# Patient Record
Sex: MALE | Race: WHITE | NOT HISPANIC OR LATINO | Employment: FULL TIME | URBAN - METROPOLITAN AREA
[De-identification: names, ages, dates, MRNs, and addresses within clinical notes are randomized per-mention and may not be internally consistent; named-entity substitution may affect disease eponyms.]

---

## 2023-02-24 LAB
ACTIVATED PARTIAL THROMBOPLASTIN TIME IN PPP BY COAGULATION ASSAY: 28 SEC (ref 26–39)
ALANINE AMINOTRANSFERASE (SGPT) (U/L) IN SER/PLAS: 28 U/L (ref 10–52)
ALBUMIN (G/DL) IN SER/PLAS: 4.2 G/DL (ref 3.4–5)
ALKALINE PHOSPHATASE (U/L) IN SER/PLAS: 42 U/L (ref 33–136)
ANION GAP IN SER/PLAS: 13 MMOL/L (ref 10–20)
ASPARTATE AMINOTRANSFERASE (SGOT) (U/L) IN SER/PLAS: 17 U/L (ref 9–39)
BASOPHILS (10*3/UL) IN BLOOD BY AUTOMATED COUNT: 0.11 X10E9/L (ref 0–0.1)
BASOPHILS/100 LEUKOCYTES IN BLOOD BY AUTOMATED COUNT: 1.1 % (ref 0–2)
BILIRUBIN DIRECT (MG/DL) IN SER/PLAS: 0.2 MG/DL (ref 0–0.3)
BILIRUBIN TOTAL (MG/DL) IN SER/PLAS: 1.2 MG/DL (ref 0–1.2)
CALCIUM (MG/DL) IN SER/PLAS: 9.2 MG/DL (ref 8.6–10.6)
CARBON DIOXIDE, TOTAL (MMOL/L) IN SER/PLAS: 26 MMOL/L (ref 21–32)
CHLORIDE (MMOL/L) IN SER/PLAS: 102 MMOL/L (ref 98–107)
CREATININE (MG/DL) IN SER/PLAS: 0.94 MG/DL (ref 0.5–1.3)
EOSINOPHILS (10*3/UL) IN BLOOD BY AUTOMATED COUNT: 0.19 X10E9/L (ref 0–0.7)
EOSINOPHILS/100 LEUKOCYTES IN BLOOD BY AUTOMATED COUNT: 2 % (ref 0–6)
ERYTHROCYTE DISTRIBUTION WIDTH (RATIO) BY AUTOMATED COUNT: 12.4 % (ref 11.5–14.5)
ERYTHROCYTE MEAN CORPUSCULAR HEMOGLOBIN CONCENTRATION (G/DL) BY AUTOMATED: 35 G/DL (ref 32–36)
ERYTHROCYTE MEAN CORPUSCULAR VOLUME (FL) BY AUTOMATED COUNT: 96 FL (ref 80–100)
ERYTHROCYTES (10*6/UL) IN BLOOD BY AUTOMATED COUNT: 4.69 X10E12/L (ref 4.5–5.9)
GFR MALE: >90 ML/MIN/1.73M2
GLUCOSE (MG/DL) IN SER/PLAS: 121 MG/DL (ref 74–99)
HEMATOCRIT (%) IN BLOOD BY AUTOMATED COUNT: 44.9 % (ref 41–52)
HEMOGLOBIN (G/DL) IN BLOOD: 15.7 G/DL (ref 13.5–17.5)
HEPATITIS B VIRUS CORE AB (PRESENCE) IN SER/PLAS BY IMM: NONREACTIVE
HEPATITIS B VIRUS SURFACE AG PRESENCE IN SERUM: NONREACTIVE
HEPATITIS C VIRUS AB PRESENCE IN SERUM: NONREACTIVE
HIV 1/ 2 AG/AB SCREEN: NONREACTIVE
IMMATURE GRANULOCYTES/100 LEUKOCYTES IN BLOOD BY AUTOMATED COUNT: 0.7 % (ref 0–0.9)
INR IN PPP BY COAGULATION ASSAY: 0.9 (ref 0.9–1.1)
LEUKOCYTES (10*3/UL) IN BLOOD BY AUTOMATED COUNT: 9.7 X10E9/L (ref 4.4–11.3)
LYMPHOCYTES (10*3/UL) IN BLOOD BY AUTOMATED COUNT: 1.38 X10E9/L (ref 1.2–4.8)
LYMPHOCYTES/100 LEUKOCYTES IN BLOOD BY AUTOMATED COUNT: 14.3 % (ref 13–44)
MONOCYTES (10*3/UL) IN BLOOD BY AUTOMATED COUNT: 0.8 X10E9/L (ref 0.1–1)
MONOCYTES/100 LEUKOCYTES IN BLOOD BY AUTOMATED COUNT: 8.3 % (ref 2–10)
NEUTROPHILS (10*3/UL) IN BLOOD BY AUTOMATED COUNT: 7.1 X10E9/L (ref 1.2–7.7)
NEUTROPHILS/100 LEUKOCYTES IN BLOOD BY AUTOMATED COUNT: 73.6 % (ref 40–80)
NRBC (PER 100 WBCS) BY AUTOMATED COUNT: 0 /100 WBC (ref 0–0)
PLATELETS (10*3/UL) IN BLOOD AUTOMATED COUNT: 252 X10E9/L (ref 150–450)
POTASSIUM (MMOL/L) IN SER/PLAS: 3.6 MMOL/L (ref 3.5–5.3)
PROTEIN TOTAL: 6.3 G/DL (ref 6.4–8.2)
PROTHROMBIN TIME (PT) IN PPP BY COAGULATION ASSAY: 9.9 SEC (ref 9.8–13.4)
SODIUM (MMOL/L) IN SER/PLAS: 137 MMOL/L (ref 136–145)
TROPONIN I, HIGH SENSITIVITY: 4 NG/L (ref 0–53)
UREA NITROGEN (MG/DL) IN SER/PLAS: 17 MG/DL (ref 6–23)

## 2023-03-06 LAB
ACTIVATED PARTIAL THROMBOPLASTIN TIME IN PPP BY COAGULATION ASSAY: 30 SEC (ref 26–39)
ALANINE AMINOTRANSFERASE (SGPT) (U/L) IN SER/PLAS: 23 U/L (ref 10–52)
ALBUMIN (G/DL) IN SER/PLAS: 4.3 G/DL (ref 3.4–5)
ALKALINE PHOSPHATASE (U/L) IN SER/PLAS: 51 U/L (ref 33–136)
ANION GAP IN SER/PLAS: 15 MMOL/L (ref 10–20)
ASPARTATE AMINOTRANSFERASE (SGOT) (U/L) IN SER/PLAS: 22 U/L (ref 9–39)
BASOPHILS (10*3/UL) IN BLOOD BY AUTOMATED COUNT: 0.09 X10E9/L (ref 0–0.1)
BASOPHILS/100 LEUKOCYTES IN BLOOD BY AUTOMATED COUNT: 1.5 % (ref 0–2)
BILIRUBIN DIRECT (MG/DL) IN SER/PLAS: 0.2 MG/DL (ref 0–0.3)
BILIRUBIN TOTAL (MG/DL) IN SER/PLAS: 1.2 MG/DL (ref 0–1.2)
CALCIUM (MG/DL) IN SER/PLAS: 9.7 MG/DL (ref 8.6–10.6)
CARBON DIOXIDE, TOTAL (MMOL/L) IN SER/PLAS: 25 MMOL/L (ref 21–32)
CHLORIDE (MMOL/L) IN SER/PLAS: 107 MMOL/L (ref 98–107)
CREATININE (MG/DL) IN SER/PLAS: 0.9 MG/DL (ref 0.5–1.3)
EOSINOPHILS (10*3/UL) IN BLOOD BY AUTOMATED COUNT: 0.26 X10E9/L (ref 0–0.7)
EOSINOPHILS/100 LEUKOCYTES IN BLOOD BY AUTOMATED COUNT: 4.4 % (ref 0–6)
ERYTHROCYTE DISTRIBUTION WIDTH (RATIO) BY AUTOMATED COUNT: 12.4 % (ref 11.5–14.5)
ERYTHROCYTE MEAN CORPUSCULAR HEMOGLOBIN CONCENTRATION (G/DL) BY AUTOMATED: 34.2 G/DL (ref 32–36)
ERYTHROCYTE MEAN CORPUSCULAR VOLUME (FL) BY AUTOMATED COUNT: 95 FL (ref 80–100)
ERYTHROCYTES (10*6/UL) IN BLOOD BY AUTOMATED COUNT: 4.92 X10E12/L (ref 4.5–5.9)
GFR MALE: >90 ML/MIN/1.73M2
GLUCOSE (MG/DL) IN SER/PLAS: 121 MG/DL (ref 74–99)
HEMATOCRIT (%) IN BLOOD BY AUTOMATED COUNT: 46.8 % (ref 41–52)
HEMOGLOBIN (G/DL) IN BLOOD: 16 G/DL (ref 13.5–17.5)
IMMATURE GRANULOCYTES/100 LEUKOCYTES IN BLOOD BY AUTOMATED COUNT: 0.3 % (ref 0–0.9)
INR IN PPP BY COAGULATION ASSAY: 0.9 (ref 0.9–1.1)
LEUKOCYTES (10*3/UL) IN BLOOD BY AUTOMATED COUNT: 5.9 X10E9/L (ref 4.4–11.3)
LYMPHOCYTES (10*3/UL) IN BLOOD BY AUTOMATED COUNT: 1.11 X10E9/L (ref 1.2–4.8)
LYMPHOCYTES/100 LEUKOCYTES IN BLOOD BY AUTOMATED COUNT: 18.8 % (ref 13–44)
MONOCYTES (10*3/UL) IN BLOOD BY AUTOMATED COUNT: 0.42 X10E9/L (ref 0.1–1)
MONOCYTES/100 LEUKOCYTES IN BLOOD BY AUTOMATED COUNT: 7.1 % (ref 2–10)
NEUTROPHILS (10*3/UL) IN BLOOD BY AUTOMATED COUNT: 4.01 X10E9/L (ref 1.2–7.7)
NEUTROPHILS/100 LEUKOCYTES IN BLOOD BY AUTOMATED COUNT: 67.9 % (ref 40–80)
NRBC (PER 100 WBCS) BY AUTOMATED COUNT: 0 /100 WBC (ref 0–0)
PLATELETS (10*3/UL) IN BLOOD AUTOMATED COUNT: 267 X10E9/L (ref 150–450)
POTASSIUM (MMOL/L) IN SER/PLAS: 4.1 MMOL/L (ref 3.5–5.3)
PROTEIN TOTAL: 6.5 G/DL (ref 6.4–8.2)
PROTHROMBIN TIME (PT) IN PPP BY COAGULATION ASSAY: 10.7 SEC (ref 9.8–13.4)
SODIUM (MMOL/L) IN SER/PLAS: 143 MMOL/L (ref 136–145)
TROPONIN I, HIGH SENSITIVITY: <3 NG/L (ref 0–53)
UREA NITROGEN (MG/DL) IN SER/PLAS: 14 MG/DL (ref 6–23)

## 2023-03-29 LAB
ACTIVATED PARTIAL THROMBOPLASTIN TIME IN PPP BY COAGULATION ASSAY: 30 SEC (ref 26–39)
ALANINE AMINOTRANSFERASE (SGPT) (U/L) IN SER/PLAS: 18 U/L (ref 10–52)
ALBUMIN (G/DL) IN SER/PLAS: 4.1 G/DL (ref 3.4–5)
ALKALINE PHOSPHATASE (U/L) IN SER/PLAS: 56 U/L (ref 33–136)
ANION GAP IN SER/PLAS: 14 MMOL/L (ref 10–20)
ASPARTATE AMINOTRANSFERASE (SGOT) (U/L) IN SER/PLAS: 16 U/L (ref 9–39)
BASOPHILS (10*3/UL) IN BLOOD BY AUTOMATED COUNT: 0.11 X10E9/L (ref 0–0.1)
BASOPHILS/100 LEUKOCYTES IN BLOOD BY AUTOMATED COUNT: 1.9 % (ref 0–2)
BILIRUBIN DIRECT (MG/DL) IN SER/PLAS: 0.2 MG/DL (ref 0–0.3)
BILIRUBIN TOTAL (MG/DL) IN SER/PLAS: 1 MG/DL (ref 0–1.2)
CALCIUM (MG/DL) IN SER/PLAS: 9.3 MG/DL (ref 8.6–10.6)
CARBON DIOXIDE, TOTAL (MMOL/L) IN SER/PLAS: 24 MMOL/L (ref 21–32)
CHLORIDE (MMOL/L) IN SER/PLAS: 104 MMOL/L (ref 98–107)
CREATININE (MG/DL) IN SER/PLAS: 0.93 MG/DL (ref 0.5–1.3)
EOSINOPHILS (10*3/UL) IN BLOOD BY AUTOMATED COUNT: 0.22 X10E9/L (ref 0–0.7)
EOSINOPHILS/100 LEUKOCYTES IN BLOOD BY AUTOMATED COUNT: 3.9 % (ref 0–6)
ERYTHROCYTE DISTRIBUTION WIDTH (RATIO) BY AUTOMATED COUNT: 11.9 % (ref 11.5–14.5)
ERYTHROCYTE MEAN CORPUSCULAR HEMOGLOBIN CONCENTRATION (G/DL) BY AUTOMATED: 34 G/DL (ref 32–36)
ERYTHROCYTE MEAN CORPUSCULAR VOLUME (FL) BY AUTOMATED COUNT: 96 FL (ref 80–100)
ERYTHROCYTES (10*6/UL) IN BLOOD BY AUTOMATED COUNT: 4.54 X10E12/L (ref 4.5–5.9)
GFR MALE: >90 ML/MIN/1.73M2
GLUCOSE (MG/DL) IN SER/PLAS: 182 MG/DL (ref 74–99)
HEMATOCRIT (%) IN BLOOD BY AUTOMATED COUNT: 43.8 % (ref 41–52)
HEMOGLOBIN (G/DL) IN BLOOD: 14.9 G/DL (ref 13.5–17.5)
IMMATURE GRANULOCYTES/100 LEUKOCYTES IN BLOOD BY AUTOMATED COUNT: 0.4 % (ref 0–0.9)
INR IN PPP BY COAGULATION ASSAY: 1 (ref 0.9–1.1)
LEUKOCYTES (10*3/UL) IN BLOOD BY AUTOMATED COUNT: 5.7 X10E9/L (ref 4.4–11.3)
LYMPHOCYTES (10*3/UL) IN BLOOD BY AUTOMATED COUNT: 1.03 X10E9/L (ref 1.2–4.8)
LYMPHOCYTES/100 LEUKOCYTES IN BLOOD BY AUTOMATED COUNT: 18.1 % (ref 13–44)
MONOCYTES (10*3/UL) IN BLOOD BY AUTOMATED COUNT: 0.29 X10E9/L (ref 0.1–1)
MONOCYTES/100 LEUKOCYTES IN BLOOD BY AUTOMATED COUNT: 5.1 % (ref 2–10)
NEUTROPHILS (10*3/UL) IN BLOOD BY AUTOMATED COUNT: 4.02 X10E9/L (ref 1.2–7.7)
NEUTROPHILS/100 LEUKOCYTES IN BLOOD BY AUTOMATED COUNT: 70.6 % (ref 40–80)
NRBC (PER 100 WBCS) BY AUTOMATED COUNT: 0 /100 WBC (ref 0–0)
PLATELETS (10*3/UL) IN BLOOD AUTOMATED COUNT: 280 X10E9/L (ref 150–450)
POTASSIUM (MMOL/L) IN SER/PLAS: 3.9 MMOL/L (ref 3.5–5.3)
PROTEIN TOTAL: 6.3 G/DL (ref 6.4–8.2)
PROTHROMBIN TIME (PT) IN PPP BY COAGULATION ASSAY: 11.2 SEC (ref 9.8–13.4)
SODIUM (MMOL/L) IN SER/PLAS: 138 MMOL/L (ref 136–145)
TROPONIN I, HIGH SENSITIVITY: 3 NG/L (ref 0–53)
UREA NITROGEN (MG/DL) IN SER/PLAS: 11 MG/DL (ref 6–23)

## 2023-05-11 RX ORDER — FINASTERIDE 1 MG/1
1 TABLET, FILM COATED ORAL DAILY
COMMUNITY
Start: 2018-08-29 | End: 2024-01-08 | Stop reason: WASHOUT

## 2023-05-11 RX ORDER — MINERAL OIL
180 ENEMA (ML) RECTAL
COMMUNITY
Start: 2018-02-07

## 2023-05-11 RX ORDER — BUSPIRONE HYDROCHLORIDE 15 MG/1
1 TABLET ORAL 3 TIMES DAILY
COMMUNITY
Start: 2021-01-05 | End: 2024-01-12 | Stop reason: SDUPTHER

## 2023-05-11 RX ORDER — SILDENAFIL CITRATE 20 MG/1
20 TABLET ORAL DAILY
COMMUNITY
Start: 2017-11-22 | End: 2024-05-20

## 2023-05-11 RX ORDER — TRAZODONE HYDROCHLORIDE 50 MG/1
150 TABLET ORAL NIGHTLY
COMMUNITY
Start: 2020-03-23 | End: 2023-05-22

## 2023-05-11 RX ORDER — CITALOPRAM 20 MG/1
1 TABLET, FILM COATED ORAL DAILY
COMMUNITY
Start: 2020-04-19 | End: 2023-11-27

## 2023-05-11 ASSESSMENT — ENCOUNTER SYMPTOMS
COUGH: 1
SORE THROAT: 1
SHORTNESS OF BREATH: 1
RHINORRHEA: 1

## 2023-05-11 NOTE — PROGRESS NOTES
Subjective   Patient ID: Jose Newell is a 63 y.o. male who presents with history of chest congestion and cough    Cough  This is a new problem. The current episode started in the past 7 days. The problem has been waxing and waning. The problem occurs hourly. The cough is Productive of purulent sputum. Associated symptoms include chest pain, nasal congestion, postnasal drip, rhinorrhea, a sore throat and shortness of breath. The symptoms are aggravated by pollens and stress. Risk factors for lung disease include animal exposure.        Review of Systems   HENT:  Positive for postnasal drip, rhinorrhea and sore throat.    Respiratory:  Positive for cough and shortness of breath.    Cardiovascular:  Positive for chest pain.   The patient reports a history of constant aching pain in the lower central chest region since May 8.  He does report an increase in the intensity of the pain when coughing.  He also reports a history of the intermittent presence of secretions in the chest occurring when coughing over the past 4 days.  He reports a history of intermittent episodes of shortness of breath as well over the past 4 days.  He reports that the episodes occur if he is vigorously active for 30 minutes but over the past 4 days, he has also noted periods of time when he has not been short of breath with exertion.  He does report a history of a cough productive of thick yellow-green sputum, nasal congestion, mild postnasal drip x4 days.    No other associated symptoms.  The patient did test negative for COVID-19 yesterday.    Objective   There were no vitals taken for this visit.    Physical Exam  Head-there is a 3 cm x 5 mm area of ecchymosis located just underneath the right eye.  Palpation revealed mild tenderness over the right maxillary sinus but no tenderness over the frontal sinuses or left maxillary sinus.  Nose-turbinates not erythematous or swollen, no septal deviation noted..  Mouth-posterior pharynx not  erythematous, tonsillar pillars appeared normal, no exudates  Neck-no lymphadenopathy.  Lungs-clear.  Cardiac-rate normal, rhythm regular, no murmurs, no JVD.  Abdomen-soft, nondistended. Normal active bowel sounds. Palpation revealed no tenderness or masses  Musculoskeletal  Chest-no erythema or swelling.  Full range of motion with increased intensity of pain in the lower central chest region on bending to the left and on rotation bilaterally.  Palpation of the lower central chest region revealed increased tenderness, no increase in warmth  Assessment/Plan        Assessment  Constant aching pain located in the lower central chest region-probably secondary to neuromuscular chest discomfort secondary to cough  Intermittent presence of secretions in the chest-may be secondary to viral syndrome, bronchitis, pneumonia-unlikely  Intermittent episodes of shortness of breath occurring after working in the yard for 30 minutes-may be secondary to viral syndrome, bronchitis, sinusitis  Cough, nasal congestion, mild postnasal drip-may be secondary to viral syndrome, sinusitis  Presence of a small area of ecchymosis located under the right eye-unsure of etiology.    Plan  Continue fexofenadine, Flonase and add levofloxacin 500 mg p.o. daily x10 days.  I also told the patient that he could use saline spray 2 sprays to each nostril 2-3 times per day as needed  I recommended that he use ibuprofen 600-800 mg p.o. every 6-8 hours as needed pain.  The patient will call if symptoms have not improved in 5 days; not resolved in 10 days

## 2023-05-12 ENCOUNTER — OFFICE VISIT (OUTPATIENT)
Dept: PRIMARY CARE | Facility: CLINIC | Age: 63
End: 2023-05-12
Payer: COMMERCIAL

## 2023-05-12 VITALS
SYSTOLIC BLOOD PRESSURE: 106 MMHG | BODY MASS INDEX: 27.81 KG/M2 | HEART RATE: 92 BPM | DIASTOLIC BLOOD PRESSURE: 74 MMHG | WEIGHT: 162 LBS

## 2023-05-12 DIAGNOSIS — J01.00 ACUTE NON-RECURRENT MAXILLARY SINUSITIS: ICD-10-CM

## 2023-05-12 DIAGNOSIS — R06.09 DOE (DYSPNEA ON EXERTION): ICD-10-CM

## 2023-05-12 DIAGNOSIS — R07.89 OTHER CHEST PAIN: Primary | ICD-10-CM

## 2023-05-12 PROCEDURE — 99213 OFFICE O/P EST LOW 20 MIN: CPT | Performed by: INTERNAL MEDICINE

## 2023-05-12 RX ORDER — LEVOFLOXACIN 500 MG/1
500 TABLET, FILM COATED ORAL DAILY
Qty: 10 TABLET | Refills: 0 | Status: SHIPPED | OUTPATIENT
Start: 2023-05-12 | End: 2023-05-22

## 2023-05-20 DIAGNOSIS — G47.00 INSOMNIA, UNSPECIFIED: ICD-10-CM

## 2023-05-22 RX ORDER — TRAZODONE HYDROCHLORIDE 50 MG/1
TABLET ORAL
Qty: 360 TABLET | Refills: 1 | Status: SHIPPED | OUTPATIENT
Start: 2023-05-22 | End: 2023-11-24

## 2023-07-11 DIAGNOSIS — S29.019A STRAIN OF MUSCLE AND TENDON OF UNSPECIFIED WALL OF THORAX, INITIAL ENCOUNTER: ICD-10-CM

## 2023-07-13 RX ORDER — CYCLOBENZAPRINE HCL 5 MG
TABLET ORAL
Qty: 30 TABLET | Refills: 1 | Status: SHIPPED | OUTPATIENT
Start: 2023-07-13

## 2023-10-10 PROBLEM — S02.2XXS FRACTURE OF NASAL BONES, SEQUELA: Status: ACTIVE | Noted: 2023-10-10

## 2023-10-10 PROBLEM — M75.102 ROTATOR CUFF TEAR, LEFT: Status: ACTIVE | Noted: 2023-10-10

## 2023-10-10 PROBLEM — N52.9 ED (ERECTILE DYSFUNCTION): Status: ACTIVE | Noted: 2023-10-10

## 2023-10-10 PROBLEM — R97.20 HIGH PROSTATE SPECIFIC ANTIGEN (PSA): Status: ACTIVE | Noted: 2023-10-10

## 2023-10-10 PROBLEM — M67.951 TENDINOPATHY OF RIGHT GLUTEUS MEDIUS: Status: ACTIVE | Noted: 2023-10-10

## 2023-10-10 PROBLEM — R31.9 HEMATURIA: Status: ACTIVE | Noted: 2023-10-10

## 2023-10-10 PROBLEM — G47.00 INSOMNIA, PERSISTENT: Status: ACTIVE | Noted: 2023-10-10

## 2023-10-10 PROBLEM — R06.89 DIFFICULTY BREATHING: Status: ACTIVE | Noted: 2023-10-10

## 2023-10-10 PROBLEM — F41.9 ANXIETY: Status: ACTIVE | Noted: 2023-10-10

## 2023-10-10 PROBLEM — N13.8 BPH WITH OBSTRUCTION/LOWER URINARY TRACT SYMPTOMS: Status: ACTIVE | Noted: 2023-10-10

## 2023-10-10 PROBLEM — L30.9 ECZEMA: Status: ACTIVE | Noted: 2023-10-10

## 2023-10-10 PROBLEM — R25.2 MUSCLE CRAMPS AT NIGHT: Status: ACTIVE | Noted: 2023-10-10

## 2023-10-10 PROBLEM — S29.019A STRAIN OF THORACIC REGION: Status: ACTIVE | Noted: 2023-10-10

## 2023-10-10 PROBLEM — R44.8 FACIAL PRESSURE: Status: ACTIVE | Noted: 2023-10-10

## 2023-10-10 PROBLEM — M25.859 FEMOROACETABULAR IMPINGEMENT: Status: ACTIVE | Noted: 2023-10-10

## 2023-10-10 PROBLEM — F11.90 OPIATE USE: Status: ACTIVE | Noted: 2023-10-10

## 2023-10-10 PROBLEM — J34.829 NASAL VALVE COLLAPSE: Status: ACTIVE | Noted: 2023-10-10

## 2023-10-10 PROBLEM — J34.3 NASAL TURBINATE HYPERTROPHY: Status: ACTIVE | Noted: 2023-10-10

## 2023-10-10 PROBLEM — H10.9 CONJUNCTIVITIS: Status: ACTIVE | Noted: 2023-10-10

## 2023-10-10 PROBLEM — M95.0 NASAL DEFORMITY: Status: ACTIVE | Noted: 2023-10-10

## 2023-10-10 PROBLEM — R00.0 TACHYCARDIA: Status: ACTIVE | Noted: 2023-10-10

## 2023-10-10 PROBLEM — M54.12 CERVICAL RADICULOPATHY: Status: ACTIVE | Noted: 2023-10-10

## 2023-10-10 PROBLEM — J34.2 DEVIATED NASAL SEPTUM: Status: ACTIVE | Noted: 2023-10-10

## 2023-10-10 PROBLEM — H93.8X2 SENSATION OF FULLNESS IN LEFT EAR: Status: ACTIVE | Noted: 2023-10-10

## 2023-10-10 PROBLEM — N40.1 BPH WITH OBSTRUCTION/LOWER URINARY TRACT SYMPTOMS: Status: ACTIVE | Noted: 2023-10-10

## 2023-10-10 PROBLEM — R22.0 SWOLLEN NOSE: Status: ACTIVE | Noted: 2023-10-10

## 2023-10-10 PROBLEM — R04.0 EPISTAXIS, RECURRENT: Status: ACTIVE | Noted: 2023-10-10

## 2023-10-10 PROBLEM — M40.202 CERVICAL KYPHOSIS: Status: ACTIVE | Noted: 2023-10-10

## 2023-10-10 PROBLEM — R35.0 URINARY FREQUENCY: Status: ACTIVE | Noted: 2023-10-10

## 2023-10-10 PROBLEM — L65.9 ALOPECIA: Status: ACTIVE | Noted: 2023-10-10

## 2023-10-10 PROBLEM — R13.19 ESOPHAGEAL DYSPHAGIA: Status: ACTIVE | Noted: 2023-10-10

## 2023-10-10 PROBLEM — L71.9 ACNE ROSACEA: Status: ACTIVE | Noted: 2023-10-10

## 2023-10-10 PROBLEM — M95.0 NASAL VALVE COLLAPSE: Status: ACTIVE | Noted: 2023-10-10

## 2023-10-10 RX ORDER — POLYETHYLENE GLYCOL 3350, SODIUM CHLORIDE, SODIUM BICARBONATE, POTASSIUM CHLORIDE 420; 11.2; 5.72; 1.48 G/4L; G/4L; G/4L; G/4L
POWDER, FOR SOLUTION ORAL
COMMUNITY
Start: 2023-08-23 | End: 2024-01-08 | Stop reason: ALTCHOICE

## 2023-10-10 RX ORDER — OXYCODONE AND ACETAMINOPHEN 5; 325 MG/1; MG/1
1 TABLET ORAL EVERY 6 HOURS PRN
COMMUNITY
Start: 2023-06-28 | End: 2024-01-08 | Stop reason: ALTCHOICE

## 2023-10-10 RX ORDER — PIMECROLIMUS 10 MG/G
1 CREAM TOPICAL
COMMUNITY
Start: 2015-03-16 | End: 2024-04-21 | Stop reason: WASHOUT

## 2023-10-10 RX ORDER — IBUPROFEN 200 MG
2 TABLET ORAL DAILY
COMMUNITY
Start: 2013-06-19 | End: 2024-01-08 | Stop reason: WASHOUT

## 2023-10-10 RX ORDER — LORAZEPAM 1 MG/1
1 TABLET ORAL 3 TIMES DAILY PRN
COMMUNITY
Start: 2016-10-22 | End: 2024-01-08 | Stop reason: WASHOUT

## 2023-10-10 RX ORDER — TRIAMCINOLONE ACETONIDE 1 MG/G
1 CREAM TOPICAL 2 TIMES DAILY
COMMUNITY
Start: 2012-06-18

## 2023-10-10 RX ORDER — ALCLOMETASONE DIPROPIONATE 0.5 MG/G
CREAM TOPICAL
COMMUNITY
Start: 2018-09-17

## 2023-10-10 RX ORDER — ONDANSETRON 4 MG/1
4 TABLET, FILM COATED ORAL EVERY 6 HOURS PRN
COMMUNITY
Start: 2022-10-25 | End: 2024-01-08 | Stop reason: WASHOUT

## 2023-10-10 RX ORDER — METRONIDAZOLE 7.5 MG/G
GEL TOPICAL 2 TIMES DAILY
COMMUNITY
Start: 2020-05-29 | End: 2024-04-21 | Stop reason: WASHOUT

## 2023-10-10 RX ORDER — BETAMETHASONE DIPROPIONATE 0.5 MG/G
CREAM TOPICAL 2 TIMES DAILY
COMMUNITY

## 2023-10-10 RX ORDER — NAPROXEN 375 MG/1
375 TABLET ORAL
COMMUNITY
Start: 2005-08-15 | End: 2024-01-08 | Stop reason: WASHOUT

## 2023-10-10 RX ORDER — DESOXIMETASONE 0.5 MG/G
CREAM TOPICAL
COMMUNITY
Start: 2018-11-29 | End: 2024-01-08 | Stop reason: WASHOUT

## 2023-10-10 RX ORDER — IBUPROFEN 200 MG
200 TABLET ORAL EVERY 6 HOURS PRN
COMMUNITY

## 2023-10-10 RX ORDER — CEPHALEXIN 500 MG/1
500 CAPSULE ORAL 2 TIMES DAILY
COMMUNITY
Start: 2022-10-25 | End: 2023-10-23 | Stop reason: ALTCHOICE

## 2023-10-10 RX ORDER — TRAMADOL HYDROCHLORIDE 50 MG/1
1 TABLET ORAL EVERY 6 HOURS PRN
COMMUNITY
Start: 2023-08-29

## 2023-10-11 ENCOUNTER — TREATMENT (OUTPATIENT)
Dept: PHYSICAL THERAPY | Facility: HOSPITAL | Age: 63
End: 2023-10-11
Payer: COMMERCIAL

## 2023-10-11 DIAGNOSIS — M25.512 ACUTE PAIN OF LEFT SHOULDER: Primary | ICD-10-CM

## 2023-10-11 PROCEDURE — 97140 MANUAL THERAPY 1/> REGIONS: CPT | Mod: GP | Performed by: PHYSICAL THERAPIST

## 2023-10-11 PROCEDURE — 97110 THERAPEUTIC EXERCISES: CPT | Mod: GP | Performed by: PHYSICAL THERAPIST

## 2023-10-11 PROCEDURE — 97016 VASOPNEUMATIC DEVICE THERAPY: CPT | Mod: GP | Performed by: PHYSICAL THERAPIST

## 2023-10-11 NOTE — PROGRESS NOTES
Physical Therapy    Physical Therapy Treatment    Patient Name: Jose Newell  MRN: [id number]  Today's Date: 10/11/2023  Time Calculation  Start Time: 0320  Stop Time: 0410  Time Calculation (min): 50 min      Assessment:   Progressing well with ROM and light strengthening, no concerns.     Plan:   progress with ROM and light strengthening as able.    Re-eval next session    Current Problem  1. Acute pain of left shoulder            Subjective   Doing well from L shoulder, did get his shots yesterday x3, thus felt sore today.      General  Reason for Referral: s/p L RTC repair, decompression, debridement extensive, and arthroscopic biceps tenodesis 6/28/23.  Referred By: Artur Burger MD  Precautions  Precautions  Precautions Comment: s/p L RTC repair, decompression, debridement extensive, and arthroscopic biceps tenodesis 6/28/23. (update 8/29/23: ROM and scapular strengthening. no lifting >5#  from 9/25/23: add RTC strengthening, no lifting >10-15#.)      Objective   supine AROM flex 150deg, abd 130deg, ER 80deg, IR 60deg at end of session.   TTP along L postshoulder,     Treatments:  Therapeutic exercise (79441):   UBE L1 3min/3min   supine AROM shoulder flex 2# x15 B---defer  sidelying abd 2# x15 L---defer  arm bar 4# L f45---jjzgd  sidelying shoulder IR and ER 1# x15 L---defer  seated shoulder press 3# x15 B  Rowing 2 plates x20  lat pulldown 20# cable x20  Chest press 1 plate x15  High table push up x15  Manual Therapy (95334):    PROM L shoulder all directions.  STM along L upper back and lats with massage gun    Modalities:   game ready 10min, lower pressure, 34deg L shoulder.      Goals:   STG;   - Pt will demonstrate L shoulder flexion ROM to at least 160 deg in 6 weeks.  - Pt will demonstrate L shoulder abduction ROM to at least 160 deg in 6 weeks.  - Pt will demonstrate L shoulder rotations ROM to within 90% of R shoulder rotational ROM for return to PLOF.  - Pt will demonstrate proper seated and  standing posture to return to PLOF.     By discharge:  - Pt will score equal to or less than 18.18 on QuickDASH in 6-8 weeks.  - Pt will demonstrate 5/5 L global shoulder strength to lift items.  - Pt will report equal to or less than 3/10 L shoulder pain with rest and activity in 6-8 weeks.

## 2023-10-19 ENCOUNTER — TREATMENT (OUTPATIENT)
Dept: PHYSICAL THERAPY | Facility: HOSPITAL | Age: 63
End: 2023-10-19
Payer: COMMERCIAL

## 2023-10-19 DIAGNOSIS — M25.512 ACUTE PAIN OF LEFT SHOULDER: Primary | ICD-10-CM

## 2023-10-19 PROCEDURE — 97110 THERAPEUTIC EXERCISES: CPT | Mod: GP | Performed by: PHYSICAL THERAPIST

## 2023-10-19 PROCEDURE — 97016 VASOPNEUMATIC DEVICE THERAPY: CPT | Mod: GP | Performed by: PHYSICAL THERAPIST

## 2023-10-19 PROCEDURE — 97140 MANUAL THERAPY 1/> REGIONS: CPT | Mod: GP | Performed by: PHYSICAL THERAPIST

## 2023-10-19 NOTE — PROGRESS NOTES
Physical Therapy    Physical Therapy re-evaluation    Patient Name: Jose Newell  MRN: [id number]  Today's Date: 10/19/2023  Time Calculation  Start Time: 0345  Stop Time: 0435  Time Calculation (min): 50 min      Assessment:   Progressing well with ROM and light strengthening, progressing nicely toward goals. Future PT will work on progress on strengthening to help return to full function.      Plan:  OP PT Plan  PT Plan:  (visit 10)  PT Frequency: 1 time per week  Duration: 10 weeks  Onset Date: 06/28/23  Number of Treatments Authorized: 60  Rehab Potential: Good  Plan of Care Agreement: Patient    Progress on shoulder and scapular strengthening to full function. Issue HEP next session.       Current Problem  1. Acute pain of left shoulder              Subjective   Doing well from L shoulder, min pain, very busy with other issues but did do his HEP.     General  Reason for Referral: s/p L RTC repair, decompression, debridement extensive, and arthroscopic biceps tenodesis 6/28/23.  Referred By: Artur Burger MD  Precautions  Precautions  Precautions Comment: s/p L RTC repair, decompression, debridement extensive, and arthroscopic biceps tenodesis 6/28/23. (update 8/29/23: ROM and scapular strengthening. no lifting >5#  from 9/25/23: add RTC strengthening, no lifting >10-15#.)      Objective   Re-eval:   ROM: seated shoulder flex 145deg, abd 140deg   IR reach R T11, L L1  supine AROM flex 145deg, abd 130deg, [id number], IR 50deg   Post session AAROM flex 150deg flex, 160deg abd, 80deg ER, 60deg IR     Strength:   4+/5 B shoulder no pain flex, abd, IR and ER  4+/5 serratus ant     Palpation: slight ttp along L ant shoulder.     Test: normal scapulohumeral rhythm  Quick Dash 36%      Treatments:  Therapeutic exercise (39742):   UBE L1 3min/3min   Re-eval  Seated shoulder press 5# x15  Rowing 3 plates x20  Lats pull down 3 plates   Scaption 3# x20  Chest press 2 1/2 plated --defer  Low table push up x15  Shoulder ext  5# x15 B  Post deltoid fly 3# x15 B   Triceps kickback  3# x15 B  Biceps curl 8#---defer  Shoulder IR and ER with green band ---defer    Manual Therapy (79709):    PROM L shoulder all directions.      Modalities:   game ready 10min, lower pressure, 34deg L shoulder.      Goals:   STG; ---partially met   - Pt will demonstrate L shoulder flexion ROM to at least 160 deg in 6 weeks.  - Pt will demonstrate L shoulder abduction ROM to at least 160 deg in 6 weeks.  - Pt will demonstrate L shoulder rotations ROM to within 90% of R shoulder rotational ROM for return to PLOF.  - Pt will demonstrate proper seated and standing posture to return to PLOF.     By discharge:---working towards.   - Pt will score equal to or less than 18.18 on QuickDASH in 6-8 weeks.  - Pt will demonstrate 5/5 L global shoulder strength to lift items.  - Pt will report equal to or less than 3/10 L shoulder pain with rest and activity in 6-8 weeks.

## 2023-10-23 ENCOUNTER — OFFICE VISIT (OUTPATIENT)
Dept: GASTROENTEROLOGY | Facility: EXTERNAL LOCATION | Age: 63
End: 2023-10-23
Payer: COMMERCIAL

## 2023-10-23 DIAGNOSIS — Z12.11 ENCOUNTER FOR SCREENING FOR MALIGNANT NEOPLASM OF COLON: ICD-10-CM

## 2023-10-23 DIAGNOSIS — Z12.11 SPECIAL SCREENING FOR MALIGNANT NEOPLASMS, COLON: Primary | ICD-10-CM

## 2023-10-23 PROCEDURE — 45378 DIAGNOSTIC COLONOSCOPY: CPT | Performed by: INTERNAL MEDICINE

## 2023-10-24 ENCOUNTER — OFFICE VISIT (OUTPATIENT)
Dept: ORTHOPEDIC SURGERY | Facility: HOSPITAL | Age: 63
End: 2023-10-24
Payer: COMMERCIAL

## 2023-10-24 VITALS — BODY MASS INDEX: 27.31 KG/M2 | HEIGHT: 64 IN | WEIGHT: 160 LBS

## 2023-10-24 DIAGNOSIS — M25.512 CHRONIC LEFT SHOULDER PAIN: Primary | ICD-10-CM

## 2023-10-24 DIAGNOSIS — G89.29 CHRONIC LEFT SHOULDER PAIN: Primary | ICD-10-CM

## 2023-10-24 PROCEDURE — 99212 OFFICE O/P EST SF 10 MIN: CPT | Performed by: ORTHOPAEDIC SURGERY

## 2023-10-24 PROCEDURE — 1036F TOBACCO NON-USER: CPT | Performed by: ORTHOPAEDIC SURGERY

## 2023-10-24 ASSESSMENT — PAIN SCALES - GENERAL: PAINLEVEL_OUTOF10: 2

## 2023-10-24 ASSESSMENT — PAIN - FUNCTIONAL ASSESSMENT: PAIN_FUNCTIONAL_ASSESSMENT: 0-10

## 2023-10-24 NOTE — PROGRESS NOTES
Subjective    Patient ID: Joselyn Newell is a 63 y.o. male.    Chief Complaint: Post-op of the Left Shoulder     Last Surgery: No surgery found  Last Surgery Date: No surgery found    JOSELYN NEWELL is a 63 year old male who returns to clinic for his first postoperative visit. He is status post Left rotator cuff repair, decompression, debridement extensive, and arthroscopic biceps tenodesis on 6/28/23 at [facility].      He reports doing well after surgery. Pain is well managed. He denies use of any pain medication at this time. Using ice daily. He is sleeping without difficulty in recliner. He denies redness or warmth at incision site. Denies any fever, chills, or paresthesia. He has been very compliant with restrictions. Presents today wearing his sling. Doing well with daily home therapy for elbow, wrist and hand.      08/29/23  Joselyn presents today for a second post operative visit. He is status post Left rotator cuff repair, decompression, debridement extensive, and arthroscopic biceps tenodesis on 6/28/23 at [facility].      He explains besides his shoulder he is well. He is still in pain and ibuprofen is not really helping. He is still continuing to do his exercises and attending physical therapy.    10/24/23  Joselyn returns to the clinic today for a third post operative visit. He is status post Left rotator cuff repair, decompression, debridement extensive, and arthroscopic biceps tenodesis on 6/28/23 at [facility].     He reports today doing well. He is having some pain still. He has continued with therapy and that is going well.           Objective   Patient portal shoulder incisions are dry, intact and healing well. Minimal swelling. No warmth or erythema. No ecchymosis. Sutures were removed today and steris strips placed on incision sites on top of shoulder. Neurovascularly intact distally. 5 out of 5 wrist, hand and thumb flexion and extension without pain.  Full active range of motion of elbow.  140 degrees of forward elevation. Internally rotates to L 5. 5/5 resistance to external rotation.      Image Results:  OUTSIDE IMAGING SCAN  Ordered by an unspecified provider.      Assessment/Plan   Encounter Diagnoses:  No diagnosis found.  JOSELYN is 8 months status post left rotator cuff repair, decompression, debridement extensive, and arthroscopic biceps tenodesis     He is doing well from a surgical standpoint. He is having pain still and has some stiffness with abduction and forward flexion. He has progressed in therapy and will continue this. We provided him with another physical therapy script today. We will see him back in a month. He should not lift more than 10-15lbs still. He will continue his exercises. I emphasized scapular stabilizing exercises.     No orders of the defined types were placed in this encounter.    Follow up in 1 month for a repeat clinical exam    Scribe Attestation  By signing my name below, Soheila BUTTERFIELD , Rupeshibarcenio   attest that this documentation has been prepared under the direction and in the presence of Artur Burger MD.

## 2023-10-26 ENCOUNTER — TREATMENT (OUTPATIENT)
Dept: PHYSICAL THERAPY | Facility: HOSPITAL | Age: 63
End: 2023-10-26
Payer: COMMERCIAL

## 2023-10-26 DIAGNOSIS — M25.512 ACUTE PAIN OF LEFT SHOULDER: Primary | ICD-10-CM

## 2023-10-26 PROCEDURE — 97016 VASOPNEUMATIC DEVICE THERAPY: CPT | Mod: GP | Performed by: PHYSICAL THERAPIST

## 2023-10-26 PROCEDURE — 97110 THERAPEUTIC EXERCISES: CPT | Mod: GP | Performed by: PHYSICAL THERAPIST

## 2023-10-26 NOTE — PROGRESS NOTES
Physical Therapy    Physical Therapy treatment    Patient Name: Jose Newell  MRN: [id number]  Today's Date: 10/26/2023  Time Calculation  Start Time: 0345  Stop Time: 0435  Time Calculation (min): 50 min      Assessment:  Tolerated session well, progressed on strengthening, issued new HEP.      Plan:  OP PT Plan  PT Plan:  (visit 11)  PT Frequency: 1 time per week  Duration: 10 weeks  Onset Date: 06/28/23  Number of Treatments Authorized: 60  Rehab Potential: Good  Plan of Care Agreement: Patient    Progress on shoulder and scapular strengthening to full function.      Current Problem  1. Acute pain of left shoulder                Subjective   Doing well from L shoulder, min pain 2-3/10, saw ortho this week, recommend no lifting for more than 10-15#.    General  Reason for Referral: s/p L RTC repair, decompression, debridement extensive, and arthroscopic biceps tenodesis 6/28/23.  Referred By: Artur Burger MD  Precautions  Precautions  Precautions Comment: s/p L RTC repair, decompression, debridement extensive, and arthroscopic biceps tenodesis 6/28/23. (update 8/29/23: ROM and scapular strengthening. no lifting >5#  from 9/25/23: add RTC strengthening, no lifting >10-15#.)      Objective   No new changes.      Treatments:  Therapeutic exercise (17625):   UBE L1 3min/3min   Seated shoulder press 1 plate x20  Rowing 3 1/2 plates x20  Lats pull down 3 1/2 plates x20   Scaption 3# x20  Chest press 2 1/2 plates x20  Low table push up x15  Shoulder ext 5# x15 B  Post deltoid fly 5# x15 B   Triceps kickback  5# x15 B  Biceps curl 8# x15 B  Shoulder IR and ER with green band x20 each L  PNF red band D1 and D2 flex x15 each R/L   Issued new handout for HEP including shoulder IR and ER sidelying, shoulder press, rows, lats pulldown, pushup from table. Post deltoid fly, shoulder ext bent over, triceps kickback, biceps curl.      Modalities:   game ready 10min, lower pressure, 34deg L shoulder.      Goals:   STG;  ---partially met   - Pt will demonstrate L shoulder flexion ROM to at least 160 deg in 6 weeks.  - Pt will demonstrate L shoulder abduction ROM to at least 160 deg in 6 weeks.  - Pt will demonstrate L shoulder rotations ROM to within 90% of R shoulder rotational ROM for return to PLOF.  - Pt will demonstrate proper seated and standing posture to return to PLOF.     By discharge:---working towards.   - Pt will score equal to or less than 18.18 on QuickDASH in 6-8 weeks.  - Pt will demonstrate 5/5 L global shoulder strength to lift items.  - Pt will report equal to or less than 3/10 L shoulder pain with rest and activity in 6-8 weeks.

## 2023-11-02 ENCOUNTER — TREATMENT (OUTPATIENT)
Dept: PHYSICAL THERAPY | Facility: HOSPITAL | Age: 63
End: 2023-11-02
Payer: COMMERCIAL

## 2023-11-02 DIAGNOSIS — M25.512 ACUTE PAIN OF LEFT SHOULDER: Primary | ICD-10-CM

## 2023-11-02 PROCEDURE — 97110 THERAPEUTIC EXERCISES: CPT | Mod: GP | Performed by: PHYSICAL THERAPIST

## 2023-11-02 PROCEDURE — 97016 VASOPNEUMATIC DEVICE THERAPY: CPT | Mod: GP | Performed by: PHYSICAL THERAPIST

## 2023-11-02 NOTE — PROGRESS NOTES
Physical Therapy    Physical Therapy treatment    Patient Name: Jose Newell  MRN: [id number]  Today's Date: 11/2/2023  Time Calculation  Start Time: 0340  Stop Time: 0430  Time Calculation (min): 50 min      Assessment:  Tolerated session well, progressed on strengthening.    Plan:  OP PT Plan  PT Plan:  (visit 12)  PT Frequency: 1 time per week  Duration: 10 weeks  Onset Date: 06/28/23  Number of Treatments Authorized: 60  Rehab Potential: Good  Plan of Care Agreement: Patient    Progress on shoulder and scapular strengthening to full function. Progress on planks, IR and ER next session.       Current Problem  1. Acute pain of left shoulder            Subjective   Doing well from L shoulder, min pain 2-3/10, doing HEP, no new concerns.     General  Reason for Referral: s/p L RTC repair, decompression, debridement extensive, and arthroscopic biceps tenodesis 6/28/23.  Referred By: Artur Burger MD  Precautions  Precautions  Precautions Comment: s/p L RTC repair, decompression, debridement extensive, and arthroscopic biceps tenodesis 6/28/23. (update 8/29/23: ROM and scapular strengthening. no lifting >5#  from 9/25/23: add RTC strengthening, no lifting >10-15#.)      Objective   Shoulder AROM supine: flex 154deg, abd 155deg, ER 84deg, IR 55deg.   ER reach     IR reach     Treatments:  Therapeutic exercise (07731):   UBE L1 3min/3min   Seated shoulder press 1 plate x20  Rowing 3 1/2 plates x20  Lats pull down 3 1/2 plates x20   Chest press 2 1/2 plates x20  Horizontal abd and add 5# cable x15 each R/L  Scaption 5# x15  Mini dips from 16in bench x10  Shoulder ext 8# x15 B  Post deltoid fly 5# x15 B   Hand plank walk on and off small block x30sec --much difficulty   Shoulder IR and ER with green band x15 ea R/L    Manual:  PROM L shoulder all directions.    Modalities:   game ready 10min, lower pressure, 34deg L shoulder.      Goals:   STG; ---partially met   - Pt will demonstrate L shoulder flexion ROM to at  least 160 deg in 6 weeks.  - Pt will demonstrate L shoulder abduction ROM to at least 160 deg in 6 weeks.  - Pt will demonstrate L shoulder rotations ROM to within 90% of R shoulder rotational ROM for return to PLOF.  - Pt will demonstrate proper seated and standing posture to return to PLOF.     By discharge:---working towards.   - Pt will score equal to or less than 18.18 on QuickDASH in 6-8 weeks.  - Pt will demonstrate 5/5 L global shoulder strength to lift items.  - Pt will report equal to or less than 3/10 L shoulder pain with rest and activity in 6-8 weeks.

## 2023-11-10 ENCOUNTER — TREATMENT (OUTPATIENT)
Dept: PHYSICAL THERAPY | Facility: HOSPITAL | Age: 63
End: 2023-11-10
Payer: COMMERCIAL

## 2023-11-10 DIAGNOSIS — M25.512 ACUTE PAIN OF LEFT SHOULDER: ICD-10-CM

## 2023-11-10 PROCEDURE — 97016 VASOPNEUMATIC DEVICE THERAPY: CPT | Mod: GP | Performed by: PHYSICAL THERAPIST

## 2023-11-10 PROCEDURE — 97110 THERAPEUTIC EXERCISES: CPT | Mod: GP | Performed by: PHYSICAL THERAPIST

## 2023-11-10 NOTE — PROGRESS NOTES
Physical Therapy    Physical Therapy treatment    Patient Name: Jose Newell  MRN: [id number]  Today's Date: 11/10/2023  Time Calculation  Start Time: 0320  Stop Time: 0410  Time Calculation (min): 50 min      Assessment:  Tolerated session well, progressed on strengthening.    Plan:  OP PT Plan  PT Plan:  (visit 13)  PT Frequency: 1 time per week  Duration: 10 weeks  Onset Date: 06/28/23  Number of Treatments Authorized: 60  Rehab Potential: Good  Plan of Care Agreement: Patient    Progress on shoulder and scapular strengthening to full function.       Current Problem  1. Acute pain of left shoulder  Follow Up In Physical Therapy          Subjective   Doing well from L shoulder, min pain 2-3/10, doing HEP, no new concerns.     General  Reason for Referral: s/p L RTC repair, decompression, debridement extensive, and arthroscopic biceps tenodesis 6/28/23.  Referred By: Artur Burger MD  Precautions  Precautions  Precautions Comment: s/p L RTC repair, decompression, debridement extensive, and arthroscopic biceps tenodesis 6/28/23. (update 8/29/23: ROM and scapular strengthening. no lifting >5#  from 9/25/23: add RTC strengthening, no lifting >10-15#.)      Objective   Shoulder AROM supine: flex 155deg, abd 155deg, ER 80deg, IR 58deg.       Treatments:  Therapeutic exercise (50014):   UBE L1 3min/3min   Horizontal abd and add 5# cable x15 each R/L  PNF 5# cable D1 and D2 flex x15 each   Scaption 5# x15  Biceps curl at 90deg abd 3# x15   Mini dips from 16in bench x15  Shoulder ext 8# x15 B  Post deltoid fly 5# x15 B   Low table top plank and side plank transition x1min  Shoulder IR and ER with 5# cable x15 each R/L   Wall slides with yellow band around wrist x15    Manual:  PROM L shoulder all directions.      Goals:   STG; ---partially met   - Pt will demonstrate L shoulder flexion ROM to at least 160 deg in 6 weeks.  - Pt will demonstrate L shoulder abduction ROM to at least 160 deg in 6 weeks.  - Pt will  demonstrate L shoulder rotations ROM to within 90% of R shoulder rotational ROM for return to PLOF.  - Pt will demonstrate proper seated and standing posture to return to PLOF.     By discharge:---working towards.   - Pt will score equal to or less than 18.18 on QuickDASH in 6-8 weeks.  - Pt will demonstrate 5/5 L global shoulder strength to lift items.  - Pt will report equal to or less than 3/10 L shoulder pain with rest and activity in 6-8 weeks.

## 2023-11-15 ENCOUNTER — APPOINTMENT (OUTPATIENT)
Dept: PHYSICAL THERAPY | Facility: HOSPITAL | Age: 63
End: 2023-11-15
Payer: COMMERCIAL

## 2023-11-24 DIAGNOSIS — G47.00 INSOMNIA, UNSPECIFIED: ICD-10-CM

## 2023-11-24 RX ORDER — TRAZODONE HYDROCHLORIDE 50 MG/1
TABLET ORAL
Qty: 360 TABLET | Refills: 1 | Status: SHIPPED | OUTPATIENT
Start: 2023-11-24 | End: 2024-04-22

## 2023-11-27 DIAGNOSIS — F41.9 ANXIETY DISORDER, UNSPECIFIED: ICD-10-CM

## 2023-11-27 RX ORDER — CITALOPRAM 20 MG/1
20 TABLET, FILM COATED ORAL DAILY
Qty: 90 TABLET | Refills: 2 | Status: SHIPPED | OUTPATIENT
Start: 2023-11-27 | End: 2024-05-13 | Stop reason: DRUGHIGH

## 2023-11-28 ENCOUNTER — OFFICE VISIT (OUTPATIENT)
Dept: ORTHOPEDIC SURGERY | Facility: HOSPITAL | Age: 63
End: 2023-11-28
Payer: COMMERCIAL

## 2023-11-28 DIAGNOSIS — M25.512 CHRONIC LEFT SHOULDER PAIN: Primary | ICD-10-CM

## 2023-11-28 DIAGNOSIS — G89.29 CHRONIC LEFT SHOULDER PAIN: Primary | ICD-10-CM

## 2023-11-28 PROCEDURE — 99212 OFFICE O/P EST SF 10 MIN: CPT | Performed by: ORTHOPAEDIC SURGERY

## 2023-11-28 PROCEDURE — 1036F TOBACCO NON-USER: CPT | Performed by: ORTHOPAEDIC SURGERY

## 2023-11-28 NOTE — PROGRESS NOTES
L  surgery consultation    Subjective    Patient ID: Joselyn Newell is a 63 y.o. male.    Chief Complaint: No chief complaint on file.     Last Surgery: No surgery found  Last Surgery Date: No surgery found    JOSELYN NEWELL is a 63 year old male who returns to clinic for his first postoperative visit. He is status post Left rotator cuff repair, decompression, debridement extensive, and arthroscopic biceps tenodesis on 6/28/23 at [facility].      He reports doing well after surgery. Pain is well managed. He denies use of any pain medication at this time. Using ice daily. He is sleeping without difficulty in recliner. He denies redness or warmth at incision site. Denies any fever, chills, or paresthesia. He has been very compliant with restrictions. Presents today wearing his sling. Doing well with daily home therapy for elbow, wrist and hand.      08/29/23  Joselyn presents today for a second post operative visit. He is status post Left rotator cuff repair, decompression, debridement extensive, and arthroscopic biceps tenodesis on 6/28/23 at [facility].      He explains besides his shoulder he is well. He is still in pain and ibuprofen is not really helping. He is still continuing to do his exercises and attending physical therapy.    10/24/23  Joselyn returns to the clinic today for a third post operative visit. He is status post Left rotator cuff repair, decompression, debridement extensive, and arthroscopic biceps tenodesis on 6/28/23 at [facility].     He reports today doing well. He is having some pain still. He has continued with therapy and that is going well.     11/28/23  Joselyn returns to the clinic today to discuss surgery for his left shoulder. He is status post Left rotator cuff repair, decompression, debridement extensive, and arthroscopic biceps tenodesis on 6/28/23 at [facility].     He returns today with no new complaints. He is ecstatic  with his surgery and his progress thus far.         Objective   Patient portal shoulder incisions are dry, intact and healing well. Minimal swelling. No warmth or erythema. No ecchymosis. Sutures were removed today and steris strips placed on incision sites on top of shoulder. Neurovascularly intact distally. 5 out of 5 wrist, hand and thumb flexion and extension without pain. Full active range of motion of elbow.  140 degrees of forward elevation. Internally rotates to L 5. 5/5 resistance to external rotation. Mild tenderness along biceps tendon      Image Results:  OUTSIDE IMAGING SCAN  Ordered by an unspecified provider.      Assessment/Plan   Encounter Diagnoses:  No diagnosis found.  JOSELYN is 5 months status post left rotator cuff repair, decompression, debridement extensive, and arthroscopic biceps tenodesis     He is doing extremely well in his recovery, he has no new complaints today. He will continue with his at-home therapy. I encouraged him to keep working on his exercises. We will see him back on an as needed basis.     No orders of the defined types were placed in this encounter.    Follow up as needed    Scribe Attestation  By signing my name below, I, Abad Serrano   attest that this documentation has been prepared under the direction and in the presence of Artur Burger MD.

## 2023-11-30 ENCOUNTER — TREATMENT (OUTPATIENT)
Dept: PHYSICAL THERAPY | Facility: HOSPITAL | Age: 63
End: 2023-11-30
Payer: COMMERCIAL

## 2023-11-30 DIAGNOSIS — M25.512 ACUTE PAIN OF LEFT SHOULDER: Primary | ICD-10-CM

## 2023-11-30 PROCEDURE — 97110 THERAPEUTIC EXERCISES: CPT | Mod: GP | Performed by: PHYSICAL THERAPIST

## 2023-11-30 NOTE — PROGRESS NOTES
Physical Therapy    Physical Therapy treatment    Patient Name: Jose Newell  MRN: [id number]  Today's Date: 11/30/2023  Time Calculation  Start Time: 0315  Stop Time: 0355  Time Calculation (min): 40 min      Assessment:  Tolerated session well, progressed on strengthening and dynamic exercises.     Plan:  OP PT Plan  PT Plan:  (visit 13)  PT Frequency: 1 time per week  Duration: 10 weeks  Onset Date: 06/28/23  Number of Treatments Authorized: 60  Rehab Potential: Good  Plan of Care Agreement: Patient    Add single arm plyotoss, IR/ER dynamic speed,  update HEP next session, possibly discharge after next session.        Current Problem  1. Acute pain of left shoulder              Subjective   Doing well from L shoulder, doing HEP, working on getting stronger, saw ortho and discharged from ortho follow up.     General  Reason for Referral: s/p L RTC repair, decompression, debridement extensive, and arthroscopic biceps tenodesis 6/28/23.  Referred By: Artur Burger MD  Precautions  Precautions  Precautions Comment: s/p L RTC repair, decompression, debridement extensive, and arthroscopic biceps tenodesis 6/28/23. (update 8/29/23: ROM and scapular strengthening. no lifting >5#  from 9/25/23: add RTC strengthening, no lifting >10-15#.  Last ortho 11/28/23 follow up: no further ortho follow up needed.)      Objective   Shoulder AROM supine: flex 155deg, abd 155deg, ER 80deg, IR 58deg.       Treatments:  Therapeutic exercise (39129):   UBE L2 3min/3min   PNF 5# cable D1 and D2 flex x15 each   Shoulder IR and ER 5#cable, at 90eg flex then 90deg abd x15 each R/L   Body blades shoulder flex and abd at 90deg, 30sec each R/L  Floor push up x10  Biceps curl at 90deg abd 3# x15   dips from 16in bench x10  Plank walk from floor to block x30sec   Double arm chest plyotoss,  overhead toss, side toss R/L 30sec each       Goals:   STG; ---partially met   - Pt will demonstrate L shoulder flexion ROM to at least 160 deg in 6  weeks.  - Pt will demonstrate L shoulder abduction ROM to at least 160 deg in 6 weeks.  - Pt will demonstrate L shoulder rotations ROM to within 90% of R shoulder rotational ROM for return to PLOF.  - Pt will demonstrate proper seated and standing posture to return to PLOF.     By discharge:---working towards.   - Pt will score equal to or less than 18.18 on QuickDASH in 6-8 weeks.  - Pt will demonstrate 5/5 L global shoulder strength to lift items.  - Pt will report equal to or less than 3/10 L shoulder pain with rest and activity in 6-8 weeks.

## 2023-12-14 ENCOUNTER — TREATMENT (OUTPATIENT)
Dept: PHYSICAL THERAPY | Facility: HOSPITAL | Age: 63
End: 2023-12-14
Payer: COMMERCIAL

## 2023-12-14 DIAGNOSIS — M25.512 ACUTE PAIN OF LEFT SHOULDER: Primary | ICD-10-CM

## 2023-12-14 PROCEDURE — 97110 THERAPEUTIC EXERCISES: CPT | Mod: GP | Performed by: PHYSICAL THERAPIST

## 2023-12-14 NOTE — PROGRESS NOTES
Physical Therapy    Physical Therapy Discharge     Patient Name: Jose Newell  MRN: [id number]  Today's Date: 12/14/2023  Time Calculation  Start Time: 0305  Stop Time: 0345  Time Calculation (min): 40 min      Assessment:  Doing well, goals met.     Plan:  OP PT Plan  PT Plan:  (visit 15)  PT Frequency: 1 time per week  Duration: 10 weeks  Onset Date: 06/28/23  Number of Treatments Authorized: 60  Rehab Potential: Good  Plan of Care Agreement: Patient    discharge from PT.       Current Problem  1. Acute pain of left shoulder                Subjective   Doing well from L shoulder, doing HEP, still has some intermittent discomfort L ant shoulder, working on getting stronger, saw ortho and discharged from ortho follow up. No other concerns.       General  Reason for Referral: s/p L RTC repair, decompression, debridement extensive, and arthroscopic biceps tenodesis 6/28/23.  Referred By: Artur Burger MD  Precautions  Precautions  Precautions Comment: s/p L RTC repair, decompression, debridement extensive, and arthroscopic biceps tenodesis 6/28/23. (update 8/29/23: ROM and scapular strengthening. no lifting >5#  from 9/25/23: add RTC strengthening, no lifting >10-15#.  Last ortho 11/28/23 follow up: no further ortho follow up needed.)      Objective   Shoulder AROM supine: flex 160deg, abd 155deg, ER 90deg, IR 50deg.   Strength 5/5 flex, abd, IR, 4+/5 ER  Quick Dash 11%      Treatments:  Therapeutic exercise (06770):   UBE L3 3min/3min   Shoulder IR and ER 5#cable, at 90eg flex then 90deg abd x15 slow, x15 fast each R/L   Body blades shoulder flex and abd at 90deg, 30sec each R/L  Floor push up x10  Plank on ball 10sec then plank walk f/b x20sec  Plank on ball 10sec then plank walk s/s x20sec  Biceps curl at 90deg abd 3# x15   dips from 16in bench x10  single arm chest plyotoss,  overhead toss, side toss R/L 30sec each   Issued HEP to add shoulder IR and ER at 90deg abd and dynamic, rationale of continued  strengthening reviewed.      Goals:   STG; ---met   - Pt will demonstrate L shoulder flexion ROM to at least 160 deg in 6 weeks.  - Pt will demonstrate L shoulder abduction ROM to at least 160 deg in 6 weeks.  - Pt will demonstrate L shoulder rotations ROM to within 90% of R shoulder rotational ROM for return to PLOF.  - Pt will demonstrate proper seated and standing posture to return to PLOF.     By discharge:---met   - Pt will score equal to or less than 18.18 on QuickDASH in 6-8 weeks.  - Pt will demonstrate 5/5 L global shoulder strength to lift items.  - Pt will report equal to or less than 3/10 L shoulder pain with rest and activity in 6-8 weeks.

## 2024-01-08 ENCOUNTER — OFFICE VISIT (OUTPATIENT)
Dept: PRIMARY CARE | Facility: CLINIC | Age: 64
End: 2024-01-08
Payer: COMMERCIAL

## 2024-01-08 ENCOUNTER — ANCILLARY PROCEDURE (OUTPATIENT)
Dept: RADIOLOGY | Facility: CLINIC | Age: 64
End: 2024-01-08
Payer: COMMERCIAL

## 2024-01-08 VITALS
SYSTOLIC BLOOD PRESSURE: 108 MMHG | WEIGHT: 163 LBS | DIASTOLIC BLOOD PRESSURE: 70 MMHG | HEART RATE: 66 BPM | BODY MASS INDEX: 27.98 KG/M2

## 2024-01-08 DIAGNOSIS — M25.561 CHRONIC PAIN OF RIGHT KNEE: Primary | ICD-10-CM

## 2024-01-08 DIAGNOSIS — G89.29 CHRONIC PAIN OF RIGHT KNEE: ICD-10-CM

## 2024-01-08 DIAGNOSIS — M25.561 CHRONIC PAIN OF RIGHT KNEE: ICD-10-CM

## 2024-01-08 DIAGNOSIS — G89.29 CHRONIC PAIN OF RIGHT KNEE: Primary | ICD-10-CM

## 2024-01-08 PROCEDURE — 73562 X-RAY EXAM OF KNEE 3: CPT | Mod: RT

## 2024-01-08 PROCEDURE — 99212 OFFICE O/P EST SF 10 MIN: CPT | Performed by: INTERNAL MEDICINE

## 2024-01-08 PROCEDURE — 73562 X-RAY EXAM OF KNEE 3: CPT | Mod: RIGHT SIDE | Performed by: RADIOLOGY

## 2024-01-08 PROCEDURE — 1036F TOBACCO NON-USER: CPT | Performed by: INTERNAL MEDICINE

## 2024-01-08 NOTE — PROGRESS NOTES
Subjective   Patient ID: Jose Newell is a 63 y.o. male who presents for right knee pain    HPI   The patient reports a history of intermittent episodes of aching pain over the inferolateral and inferomedial surfaces of the right knee and over the lateral end of the popliteal fossa of the right knee times years.  He reports that the episodes can be precipitated by driving and by excessive walking.  He reports that the areas are tender to palpation.  He reports that the episodes can be a few days in duration.  He reports no associated swelling, instability, preceding trauma/overexertion.  No other associated symptoms.  He does report an increase in the frequency of episodes recently.  Review of Systems    Objective   Wt 73.9 kg (163 lb)   BMI 27.98 kg/m²     Physical Exam  Musculoskeletal  Right knee-no erythema or swelling.  Full range of motion with mild pain noted in all directions of motion.  Palpation did reveal moderate to severe tenderness over the medial surface of the knee  ,over the inferior surface of the knee as well as the lateral end of the popliteal fossa, no increase in warmth or crepitus.  Negative Lachemann's test, negative Marline's test, negative patellar apprehension test.  Assessment/Plan        Assessment  Intermittent episodes of aching pain noted over the inferolateral, inferomedial surfaces of the right knee and over the lateral end of the popliteal fossa of the right knee-May be secondary to osteoarthritis, patellofemoral arthralgia  Plan  Obtain x-rays of the right knee today.  I told the patient that he could take Advil 600 mg p.o. every 6 hours as needed and that he could apply Voltaren gel to the knee every 6 hours as needed.  The patient will probably require an orthopedic or physical therapy referral.

## 2024-01-10 DIAGNOSIS — M25.561 CHRONIC PAIN OF RIGHT KNEE: Primary | ICD-10-CM

## 2024-01-10 DIAGNOSIS — G89.29 CHRONIC PAIN OF RIGHT KNEE: Primary | ICD-10-CM

## 2024-01-12 DIAGNOSIS — F41.9 ANXIETY: Primary | ICD-10-CM

## 2024-01-12 RX ORDER — BUSPIRONE HYDROCHLORIDE 15 MG/1
15 TABLET ORAL 3 TIMES DAILY
Qty: 270 TABLET | Refills: 2 | Status: SHIPPED | OUTPATIENT
Start: 2024-01-12

## 2024-01-22 ENCOUNTER — OFFICE VISIT (OUTPATIENT)
Dept: ORTHOPEDIC SURGERY | Facility: HOSPITAL | Age: 64
End: 2024-01-22
Payer: COMMERCIAL

## 2024-01-22 VITALS — WEIGHT: 162 LBS | BODY MASS INDEX: 27.66 KG/M2 | HEIGHT: 64 IN

## 2024-01-22 DIAGNOSIS — M25.561 CHRONIC PAIN OF RIGHT KNEE: ICD-10-CM

## 2024-01-22 DIAGNOSIS — G89.29 CHRONIC PAIN OF RIGHT KNEE: ICD-10-CM

## 2024-01-22 DIAGNOSIS — M17.11 PRIMARY OSTEOARTHRITIS OF RIGHT KNEE: Primary | ICD-10-CM

## 2024-01-22 PROCEDURE — 20611 DRAIN/INJ JOINT/BURSA W/US: CPT | Performed by: STUDENT IN AN ORGANIZED HEALTH CARE EDUCATION/TRAINING PROGRAM

## 2024-01-22 PROCEDURE — 2500000004 HC RX 250 GENERAL PHARMACY W/ HCPCS (ALT 636 FOR OP/ED): Performed by: STUDENT IN AN ORGANIZED HEALTH CARE EDUCATION/TRAINING PROGRAM

## 2024-01-22 PROCEDURE — 99214 OFFICE O/P EST MOD 30 MIN: CPT | Performed by: EMERGENCY MEDICINE

## 2024-01-22 PROCEDURE — 1036F TOBACCO NON-USER: CPT | Performed by: EMERGENCY MEDICINE

## 2024-01-22 PROCEDURE — 2500000005 HC RX 250 GENERAL PHARMACY W/O HCPCS: Performed by: STUDENT IN AN ORGANIZED HEALTH CARE EDUCATION/TRAINING PROGRAM

## 2024-01-22 PROCEDURE — 99204 OFFICE O/P NEW MOD 45 MIN: CPT | Performed by: EMERGENCY MEDICINE

## 2024-01-22 RX ADMIN — TRIAMCINOLONE ACETONIDE 80 MG: 200 INJECTION, SUSPENSION INTRA-ARTICULAR; INTRAMUSCULAR at 16:15

## 2024-01-22 RX ADMIN — LIDOCAINE HYDROCHLORIDE 4 ML: 10 INJECTION, SOLUTION INFILTRATION; PERINEURAL at 16:15

## 2024-01-22 ASSESSMENT — PAIN SCALES - GENERAL: PAINLEVEL_OUTOF10: 3

## 2024-01-22 ASSESSMENT — ENCOUNTER SYMPTOMS: KNEE SWELLING: 1

## 2024-01-22 ASSESSMENT — PAIN DESCRIPTION - DESCRIPTORS: DESCRIPTORS: ACHING

## 2024-01-22 ASSESSMENT — PAIN - FUNCTIONAL ASSESSMENT: PAIN_FUNCTIONAL_ASSESSMENT: 0-10

## 2024-01-22 NOTE — PROGRESS NOTES
Subjective   Jose Newell is a 63 y.o. male who presents for Pain of the Right Knee    Right Knee       Presents on referral from Dr. Gomez with complaint of right knee pain that is been bothering him for the past several months.  Denies specific injury or trauma.  He is normally an avid cyclist, but recently had left shoulder surgery and was unable to cycle, so he had been increasing his walking as a form of exercise.  Pain started worsening after prolonged walks.  Pain is also worse with going up and down stairs, walking on uneven surfaces, or when driving for prolonged periods of time.  Says he has had intermittent pain in this knee in the past, but has not been bothering him for several years.  He has been taking intermittent ibuprofen or using Voltaren gel with moderate improvement of his pain.  Denies any numbness or tingling to the extremities.  He is hoping to get a right knee corticosteroid injection today as he states they were very helpful for his left shoulder when he had them in the past.    ROS: All pertinent positive symptoms are included in the history of present illness.    All other systems have been reviewed and are negative and noncontributory to this patient's current ailments.    Objective     Physical Exam  General/Constitutional: No apparent distress. Well-nourished and well developed.  Head: Normocephalic, Atraumatic.   Eyes: EOMI.  Vascular: No edema, swelling or tenderness, except as noted in detailed exam.  Respiratory: Non-labored breathing.  Integumentary: No impressive skin lesions present, except as noted in detailed exam.  Neurological: Alert and oriented.  Psychological:  Normal mood and affect.  Musculoskeletal: Normal, except as noted in detailed exam.  The RIGHT knee has no joint effusion. Patella crepitus and grind are positive. There is moderate tenderness to the medial and lateral joint lines. Flexion and extension are without mechanical blocking. There is no instability  with stress testing.    IMAGING:  Radiographs of of the right knee obtained today demonstrate mild degenerative change.    === 01/08/24 ===    XR KNEE 3 VIEWS RIGHT    - Impression -  Mild degenerative changes with small joint effusion. No acute osseous  abnormality.      MACRO:  None    Signed by: Ivan Longoria 1/9/2024 8:52 PM  Dictation workstation:   [id number]      Patient ID: Jose Newell is a 63 y.o. male.    L Inj/Asp: R knee on 1/22/2024 4:15 PM  Indications: pain  Details: 25 G needle, ultrasound-guided superolateral approach  Medications: 80 mg triamcinolone acetonide 40 mg/mL; 4 mL lidocaine 10 mg/mL (1 %)  Outcome: tolerated well, no immediate complications  Procedure, treatment alternatives, risks and benefits explained, specific risks discussed. Consent was given by the patient.           Assessment/Plan   Problem List Items Addressed This Visit    None  Visit Diagnoses       Chronic pain of right knee        Relevant Orders    Point of Care Ultrasound (Completed)          We discussed the imaging and examination findings together in office today.  We discussed that his pain is most likely due to his right knee osteoarthritis.  We discussed multiple treatment options, including home exercise program, which she is already doing, ibuprofen, Voltaren, or corticosteroid injection.  Discussed risks versus benefits of having injections performed. Patient has elected to proceed with procedures. Please refer to procedure notes above. Discussed with patient to limit weightbearing activities over the next 24-48 hours, they can then progress to full activities as tolerated. Discussed with patient to avoid water submersion over the next 2 days. Discussed with patient to call me immediately if they develop worsening pain, rash, erythema, or fevers.  He will follow-up as needed when his pain returns.

## 2024-01-28 RX ORDER — TRIAMCINOLONE ACETONIDE 40 MG/ML
80 INJECTION, SUSPENSION INTRA-ARTICULAR; INTRAMUSCULAR
Status: COMPLETED | OUTPATIENT
Start: 2024-01-22 | End: 2024-01-22

## 2024-01-28 RX ORDER — LIDOCAINE HYDROCHLORIDE 10 MG/ML
4 INJECTION INFILTRATION; PERINEURAL
Status: COMPLETED | OUTPATIENT
Start: 2024-01-22 | End: 2024-01-22

## 2024-04-21 NOTE — PROGRESS NOTES
Subjective   Patient ID: Jose Newell is a 64 y.o. male who presents for anxiety.    HPI   The patient reports that he has been much more anxious times approximately 6 months.  He does report associated increase in appetite with more snacking.  He also reports associated irritability.  He reports no insomnia, decreased concentration, fatigue, anhedonia.  Review of Systems    Objective   There were no vitals taken for this visit.    Physical Exam  Lungs-clear  Cardiac-rate normal, rhythm regular, no murmurs, no JVD  Assessment/Plan        Assessment  Polyphagia-May be secondary to anxiety  Anxiety  Plan  Increase citalopram dosage to 30 mg daily.  Continue buspirone at a dose of 15 mg p.o. 3 times daily as needed.  The patient will schedule a virtual visit in 3 weeks

## 2024-04-22 ENCOUNTER — OFFICE VISIT (OUTPATIENT)
Dept: PRIMARY CARE | Facility: CLINIC | Age: 64
End: 2024-04-22
Payer: COMMERCIAL

## 2024-04-22 VITALS
DIASTOLIC BLOOD PRESSURE: 66 MMHG | BODY MASS INDEX: 27.84 KG/M2 | SYSTOLIC BLOOD PRESSURE: 98 MMHG | HEART RATE: 74 BPM | WEIGHT: 162.2 LBS

## 2024-04-22 DIAGNOSIS — G47.00 INSOMNIA, UNSPECIFIED: ICD-10-CM

## 2024-04-22 DIAGNOSIS — F41.9 ANXIETY: Primary | ICD-10-CM

## 2024-04-22 DIAGNOSIS — G47.00 INSOMNIA, PERSISTENT: ICD-10-CM

## 2024-04-22 PROCEDURE — 99212 OFFICE O/P EST SF 10 MIN: CPT | Performed by: INTERNAL MEDICINE

## 2024-04-22 RX ORDER — GLUCOSAMINE/CHONDRO SU A 500-400 MG
2 TABLET ORAL DAILY
COMMUNITY

## 2024-04-22 RX ORDER — TRAZODONE HYDROCHLORIDE 50 MG/1
100 TABLET ORAL NIGHTLY
Qty: 360 TABLET | Refills: 1 | Status: SHIPPED | OUTPATIENT
Start: 2024-04-22

## 2024-05-11 NOTE — PROGRESS NOTES
Subjective   Patient ID: Jose Newell is a 64 y.o. male who presents for 3-week follow-up visit    HPI   The patient reports that since a few weeks after increasing his citalopram dosage to 30 mg daily, he has been more calm.  Also, since a few weeks after increasing the dosing, he has been less irritable.  Over the past few weeks, he has also noted a decrease in appetite.  He reports no other associated symptoms.  Review of Systems    Objective   There were no vitals taken for this visit.    Physical Exam  General-the patient is alert and oriented x 3 and is in no apparent distress.  Assessment/Plan        Assessment  Anxiety  Plan  Continue current medication regimen for now.  I recommended that the patient schedule a live, or virtual visit in late July 2024    7 minutes were spent in patient care

## 2024-05-13 ENCOUNTER — TELEMEDICINE (OUTPATIENT)
Dept: PRIMARY CARE | Facility: CLINIC | Age: 64
End: 2024-05-13
Payer: COMMERCIAL

## 2024-05-13 DIAGNOSIS — M25.561 CHRONIC PAIN OF RIGHT KNEE: ICD-10-CM

## 2024-05-13 DIAGNOSIS — F41.9 ANXIETY: Primary | ICD-10-CM

## 2024-05-13 DIAGNOSIS — G89.29 CHRONIC PAIN OF RIGHT KNEE: ICD-10-CM

## 2024-05-13 DIAGNOSIS — F41.9 ANXIETY DISORDER, UNSPECIFIED: ICD-10-CM

## 2024-05-13 PROCEDURE — 99441 PR PHYS/QHP TELEPHONE EVALUATION 5-10 MIN: CPT | Performed by: INTERNAL MEDICINE

## 2024-05-13 RX ORDER — CITALOPRAM 20 MG/1
30 TABLET, FILM COATED ORAL DAILY
Qty: 135 TABLET | Refills: 2 | Status: SHIPPED | OUTPATIENT
Start: 2024-05-13

## 2024-05-13 RX ORDER — CITALOPRAM 20 MG/1
30 TABLET, FILM COATED ORAL DAILY
Qty: 90 TABLET | Refills: 2 | Status: SHIPPED | OUTPATIENT
Start: 2024-05-13 | End: 2024-05-13 | Stop reason: SDUPTHER

## 2024-05-18 DIAGNOSIS — N52.9 ERECTILE DYSFUNCTION, UNSPECIFIED ERECTILE DYSFUNCTION TYPE: Primary | ICD-10-CM

## 2024-05-20 RX ORDER — SILDENAFIL CITRATE 20 MG/1
TABLET ORAL
Qty: 50 TABLET | Refills: 0 | Status: SHIPPED | OUTPATIENT
Start: 2024-05-20

## 2024-07-20 DIAGNOSIS — S29.019A STRAIN OF MUSCLE AND TENDON OF UNSPECIFIED WALL OF THORAX, INITIAL ENCOUNTER: ICD-10-CM

## 2024-07-22 RX ORDER — CYCLOBENZAPRINE HCL 5 MG
TABLET ORAL
Qty: 30 TABLET | Refills: 1 | Status: SHIPPED | OUTPATIENT
Start: 2024-07-22

## 2024-08-09 DIAGNOSIS — F41.9 ANXIETY DISORDER, UNSPECIFIED: ICD-10-CM

## 2024-08-09 RX ORDER — CITALOPRAM 20 MG/1
30 TABLET, FILM COATED ORAL DAILY
Qty: 135 TABLET | Refills: 3 | Status: SHIPPED | OUTPATIENT
Start: 2024-08-09

## 2024-08-13 ENCOUNTER — APPOINTMENT (OUTPATIENT)
Dept: PRIMARY CARE | Facility: CLINIC | Age: 64
End: 2024-08-13
Payer: COMMERCIAL

## 2024-10-07 NOTE — PROGRESS NOTES
Subjective   Patient ID: Jose Newell is a 64 y.o. male    Chief Complaint: No chief complaint on file.       Last Surgery: No surgery found  Date of Last Surgery: No surgery found    HPI  Jose Newell is a 64 y.o. ambidextrous male presenting for left shoulder pain. History of left shoulder surgery 06/2023    He is here today with a couple of months of popping and clicking in his left shoulder. This is the shoulder he had surgery on. He denies any new injury.     Objective   Patient is a well-developed, well-nourished male  in no acute distress.  Breathes with normal chest rises.  Pupils are round and symmetric today.  Awake, alert, and oriented x3.      Examination of the left shoulder today reveals the skin to be intact. There is no sign of any atrophy, lesions, or abrasions. There is no pain to palpation of the bony prominences. Cervical lymphadenopathy examined, and this was negative. Patient had 5 out of 5 wrist flexion, extension, and thumb extension bilaterally. Sensation was intact to light touch to median, ulnar, radial axillary, and musculocutaneous nerves bilaterally. Positive radial pulse bilaterally. Provocative maneuvers on the left side today were negative.  Range of motion of the left shoulder revealed 0-150° of forward elevation, 0-70° of external rotation, and internal rotation was to T-12. 5/5 Michael's. Pain with Hawkin's sign. 5/5 RER. - modified belly press test    Examination of the right shoulder today reveals the skin to be intact. There is no sign of any atrophy, lesions, or abrasions. There is no pain to palpation of the bony prominences. Cervical lymphadenopathy examined, and this was negative. Patient had 5 out of 5 wrist flexion, extension, and thumb extension, bilaterally. Sensation was intact to light touch to median, ulnar, radial, axillary, and musculocutaneous nerves bilaterally. Positive radial pulse bilaterally. Provocative maneuvers on the right side today were negative.  Range of  motion of the right shoulder revealed 0-170° of forward elevation, 0-60° of external rotation, and internal rotation up to T-12.    Imaging:  MRI of the left shoulder taken today personally ordered and interpreted by me show maintenance of joint space. Arthritis      I personally reviewed his MRI of the left shoulder from 2023 prior to surgery shows mild arthritis     L Inj/Asp: L subacromial bursa on 10/8/2024 9:42 AM  Indications: pain  Details: 20 G needle, anterior approach  Medications: 40 mg triamcinolone acetonide 40 mg/mL; 4 mL lidocaine 10 mg/mL (1 %)  Outcome: tolerated well, no immediate complications  Procedure, treatment alternatives, risks and benefits explained, specific risks discussed. Consent was given by the patient. Immediately prior to procedure a time out was called to verify the correct patient, procedure, equipment, support staff and site/side marked as required. Patient was prepped and draped in the usual sterile fashion.             Assessment/Plan   No diagnosis found.  Patient is s/p left shoulder surgery with new onset pain    He had been doing well with his shoulder. He has new onset popping and catching. It is my opinion that this is soft tissue in nature based on MRI findings. We discussed his options. He may continue to live with this and know that his shoulder will not get worse with use. He may consider an injection, or we can repeat an MRI. He requested an injection. He tolerated this well. If this progressively gets worse then we will obtain an MRI.       No orders of the defined types were placed in this encounter.        Follow up as needed    Scribe Attestation  By signing my name below, ISoheila , [email or web address]   attest that this documentation has been prepared under the direction and in the presence of Artur Burger MD.

## 2024-10-08 ENCOUNTER — HOSPITAL ENCOUNTER (OUTPATIENT)
Dept: RADIOLOGY | Facility: HOSPITAL | Age: 64
Discharge: HOME | End: 2024-10-08
Payer: COMMERCIAL

## 2024-10-08 ENCOUNTER — OFFICE VISIT (OUTPATIENT)
Dept: ORTHOPEDIC SURGERY | Facility: HOSPITAL | Age: 64
End: 2024-10-08
Payer: COMMERCIAL

## 2024-10-08 ENCOUNTER — APPOINTMENT (OUTPATIENT)
Dept: ORTHOPEDIC SURGERY | Facility: HOSPITAL | Age: 64
End: 2024-10-08
Payer: COMMERCIAL

## 2024-10-08 DIAGNOSIS — M25.512 LEFT SHOULDER PAIN, UNSPECIFIED CHRONICITY: Primary | ICD-10-CM

## 2024-10-08 DIAGNOSIS — M25.512 LEFT SHOULDER PAIN, UNSPECIFIED CHRONICITY: ICD-10-CM

## 2024-10-08 PROCEDURE — 2500000004 HC RX 250 GENERAL PHARMACY W/ HCPCS (ALT 636 FOR OP/ED): Performed by: ORTHOPAEDIC SURGERY

## 2024-10-08 PROCEDURE — 73030 X-RAY EXAM OF SHOULDER: CPT | Mod: LT

## 2024-10-08 PROCEDURE — 20610 DRAIN/INJ JOINT/BURSA W/O US: CPT | Mod: LT | Performed by: ORTHOPAEDIC SURGERY

## 2024-10-08 PROCEDURE — 73030 X-RAY EXAM OF SHOULDER: CPT | Mod: LEFT SIDE | Performed by: STUDENT IN AN ORGANIZED HEALTH CARE EDUCATION/TRAINING PROGRAM

## 2024-10-08 PROCEDURE — 99213 OFFICE O/P EST LOW 20 MIN: CPT | Mod: 25 | Performed by: ORTHOPAEDIC SURGERY

## 2024-10-08 PROCEDURE — 99213 OFFICE O/P EST LOW 20 MIN: CPT | Performed by: ORTHOPAEDIC SURGERY

## 2024-10-10 RX ORDER — LIDOCAINE HYDROCHLORIDE 10 MG/ML
4 INJECTION, SOLUTION INFILTRATION; PERINEURAL
Status: COMPLETED | OUTPATIENT
Start: 2024-10-08 | End: 2024-10-08

## 2024-10-10 RX ORDER — TRIAMCINOLONE ACETONIDE 40 MG/ML
40 INJECTION, SUSPENSION INTRA-ARTICULAR; INTRAMUSCULAR
Status: COMPLETED | OUTPATIENT
Start: 2024-10-08 | End: 2024-10-08

## 2024-11-18 DIAGNOSIS — S29.019A STRAIN OF MUSCLE AND TENDON OF UNSPECIFIED WALL OF THORAX, INITIAL ENCOUNTER: ICD-10-CM

## 2024-11-18 RX ORDER — CYCLOBENZAPRINE HCL 5 MG
TABLET ORAL
Qty: 30 TABLET | Refills: 1 | Status: SHIPPED | OUTPATIENT
Start: 2024-11-18

## 2025-01-08 DIAGNOSIS — N52.9 ERECTILE DYSFUNCTION, UNSPECIFIED ERECTILE DYSFUNCTION TYPE: ICD-10-CM

## 2025-01-08 RX ORDER — SILDENAFIL CITRATE 20 MG/1
TABLET ORAL
Qty: 50 TABLET | Refills: 1 | Status: SHIPPED | OUTPATIENT
Start: 2025-01-08

## 2025-02-02 DIAGNOSIS — G47.00 INSOMNIA, UNSPECIFIED: ICD-10-CM

## 2025-02-03 RX ORDER — TRAZODONE HYDROCHLORIDE 50 MG/1
TABLET ORAL
Qty: 360 TABLET | Refills: 1 | Status: SHIPPED | OUTPATIENT
Start: 2025-02-03

## 2025-04-02 NOTE — PROGRESS NOTES
Subjective   Patient ID: Jose Newell is a 65 y.o. male who presents for CPE    HPI   The patient reports a history of intermittent episodes of soreness in both elbows times a few months.  He reports that the episodes only occur with extension.  He reports no other associated symptoms.    He also reports a history of easier bruising times years.  He reports the presence of an area of ecchymosis located over the lateral surface of the distal end of the left upper arm x 1 week.  He reports no preceding trauma.  No other associated symptoms.    Over the past several years, he reports intermittent episodes of soreness in both knees right greater than left..  He reports that the episodes of soreness in the right knee occur over the inferior surface of the knee.  He reports that the episodes occur with overuse.  He reports no associated swelling or instability.  No other associated symptoms.    Since October 2024,, the patient reports continued chronic intermittent episodes of popping in the left shoulder.  He reports no associated symptoms and is receiving a corticosteroid injection in mid October he reports no change in the frequency or intensity of the episodes.    The patient reports that when he uses fexofenadine and sometimes Flonase, he experiences no paroxysms of sneezing and no watery eyes.    Since his surgery in October 2017, the patient reports a history of postvoid dribbling, occasional episodes of urinary frequency and intermittent episodes of urinary urgency-the episodes of urgency seem to occur when water is running.  He reports no other associated symptoms.    Over the past several years, he reports intermittent early morning episodes of cramping pain in either calf region right greater than left.  He reports that the episodes resolve soon after he gets up to walk.  He reports no other associated symptoms.  He began use of a B complex vitamin a few years ago, he reports a dramatic decrease in the  frequency but no significant change in the intensity of the episodes.    The patient does report that he does experience dramatic decrease in the intensity of joint discomfort with use of ibuprofen.  He reports no difficulty staying asleep with use of trazodone and reports no recent anxiety with use of Celexa 30 mg daily    No other new complaints.  Review of Systems  All systems have been reviewed and are normal except as previously noted  Objective   There were no vitals taken for this visit.    Physical Exam  Head - palpation revealed no tenderness over the maxillary or frontal sinuses.  Eyes-extraocular movements intact.  Pupils equal and reactive to light.  Fundi revealed good retinal color, no hemorrhages or exudates  Ears-palpation of each pinna and tragus revealed tenderness.  External auditory canal not erythematous or swollen; TMs clear  Nose - turbinates not erythematous or swollen, no nasal septal deviation noted  Mouth - posterior pharynx is not erythematous or swollen. Tonsillar pillars appeared normal no exudates.  Neck - no lymphadenopathy. Thyroid gland not enlarged, No bruits.  Lungs - clear to auscultation bilaterally.  Cardiac -  rate normal, rhythm regular, no murmurs, no JVD.   Abdomen - soft nondistended, normal active bowel sounds. Palpation revealed no masses.   Rectal-scant stool guaiac negative.  Prostate gland mildly enlarged, no masses  Pulses-upper extremities 2+ bilaterally; lower extremities 2+ bilaterally except dorsalis pedis pulses-0 bilaterally  Extremities - no peripheral edema.      Musculoskeletal  Left shoulder-no erythema or swelling.  Full range of motion with stiffness noted on abduction.  Full range of motion with tightness noted on external rotation.  Full range of motion with no pain or tightness in all other directions of motion.  Palpation did reveal tenderness over the lateral surface left upper arm region, no increase in warmth.  Slightly positive Nettles sign,  negative arm crossover test, slightly positive impingement sign, negative Neer's sign, negative empty can test, negative Yergason's sign  Elbows-no erythema or swelling.  Full range of motion with soreness noted on extension.  Full range of motion with no pain noted on flexion.  Palpation did reveal mild tenderness over the medial epicondyles of both elbows, no increase in warmth  Knees-  Right knee-no erythema or swelling.  Full range of motion in all directions of motion with no pain.  Palpation revealed mild crepitus but no tenderness or increase in warmth.  Negative Lachemann's test, negative Marline's test, negative patellar apprehension test, no pain or laxity of the collateral ligaments noted  Left knee-no erythema or swelling.  Full range of motion in all directions of motion with no pain.  Palpation revealed mild crepitus.  Palpation also revealed mild tenderness over the lateral surface of the knee but no increase in warmth.  Negative Lachemann's test, negative Marline's test, negative patellar apprehension test, no pain or laxity of the collateral ligaments noted.  Cervical spine-no erythema or swelling.  Full range of motion with pain noted in the left upper back region on extension as well as bending to the right.  Full range of motion with tightness noted throughout the cervical spine in all other directions of motion.  Palpation revealed no tenderness or increase in warmth    Skin--there is a 5 x 6 cm area of ecchymosis located over the lateral surface of the distal end of the left upper arm..  Palpation of the region revealed tenderness but no increase in warmth.  Mild scaling noted over the medial, lateral, plantar surfaces of both feet  Neurologic  Mental status-alert and oriented x3   Cranial nerves-2 through 12 grossly intact, no visual field abnormalities  Motor-no pronator drift noted, strength-5/5 in all muscle groups tested, , no tremor noted.  No bradykinesia noted.  No rigidity noted.   Negative pull test  Sensory-Light touch sensation fully intact  Pinprick sensation fully intact  Vibratory sensation fully intact  Cerebellar-no truncal ataxia, good coordination finger-nose testing,, good coordination heel-to-shin testing, normal rapid alternating movements  Romberg negative, no abnormality in tandem gait  Reflexes-left ankle jerk 1+/4, all other reflexes tested 2+/4 bilaterally  Assessment/Plan   Problem List Items Addressed This Visit             ICD-10-CM    BPH with obstruction/lower urinary tract symptoms N40.1, N13.8    Relevant Orders    CBC and Auto Differential    Comprehensive Metabolic Panel    C-Reactive Protein, High Sensitivity    Lipid Panel    Prostate Specific Antigen    Vitamin B12    Vitamin D 25-Hydroxy,Total (for eval of Vitamin D levels)    Esophageal dysphagia R13.19    Relevant Orders    CBC and Auto Differential    Comprehensive Metabolic Panel    C-Reactive Protein, High Sensitivity    Lipid Panel    Prostate Specific Antigen    Vitamin B12    Vitamin D 25-Hydroxy,Total (for eval of Vitamin D levels)     Other Visit Diagnoses         Codes    Routine general medical examination at a health care facility    -  Primary Z00.00    Relevant Orders    CBC and Auto Differential    Comprehensive Metabolic Panel    C-Reactive Protein, High Sensitivity    Lipid Panel    Prostate Specific Antigen    Vitamin B12    Vitamin D 25-Hydroxy,Total (for eval of Vitamin D levels)    Other chest pain     R07.89    Relevant Orders    CBC and Auto Differential    Comprehensive Metabolic Panel    C-Reactive Protein, High Sensitivity    Lipid Panel    Prostate Specific Antigen    Vitamin B12    Vitamin D 25-Hydroxy,Total (for eval of Vitamin D levels)    HUERTA (dyspnea on exertion)     R06.09    Relevant Orders    CBC and Auto Differential    Comprehensive Metabolic Panel    C-Reactive Protein, High Sensitivity    Lipid Panel    Prostate Specific Antigen    Vitamin B12    Vitamin D 25-Hydroxy,Total  (for eval of Vitamin D levels)    Bruising     T14.8XXA    Relevant Orders    Coagulation Screen    Anxiety disorder, unspecified     F41.9    Relevant Medications    citalopram (CeleXA) 20 mg tablet             Assessment  Dyshidrotic eczema on the hands;eczema on torso  Septal deviation, turbinate hypertrophy, 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  Chronic recurrent epistaxes from the right nostril area-unsure of etiology.  Schatzki's ring status post dilatation March 27, 2023  Gastroesophageal reflux.  Diverticulosis..  Internal hemorrhoids  Postvoid dribbling, occasional urinary frequency, intermittent urinary urgency-May be secondary to BPH, overactive bladder  BPH with bladder outlet obstruction status post TURP October 2017.  Erectile dysfunction.  Biceps tendinitis in both shoulders  Rotator cuff tendinitis of the right shoulder  Chronic intermittent episodes of popping in the left shoulder-unsure of etiology.  -Full-thickness tear of the distal anterior supraspinatus tendon, full-thickness tear of the superior fibers of the subscapularis tendon superimposed and tendinopathy, medial subluxation of the long head of the biceps tendon, complex truncation tearing of the glenoid labrum especially posteriorly--status post left rotator cuff repair, decompression, debridement extensive and arthroscopic biceps tenodesis June 28, 2023  Osteoarthritis of the left shoulder  Intermittent episodes of soreness in the elbows-May be secondary to heel epicondylitis of both elbows.  Episodes of soreness in both knees right greater than left-probably secondary to osteoarthritis  Osteoarthritis of the right knee  Long history of intermittent early morning episodes of cramping pain in either calf  region right greater than left-likely represent idiopathic nocturnal leg cramps  Chalazion left lower eyelid August 28, 2024  Long history of easier bruising-probably secondary to administration of ibuprofen  History of a syncopal episode occurring in late April 2019-probably secondary to orthostatic hypotension secondary to dehydration secondary to the patient's decreased oral intake and diarrhea.  Ischemic changes in the deep white matter of the parietal lobe and basal ganglia bilaterally  Chronic intermittent episodes of tightness in the left upper back and scapular regions-may be secondary to muscle strain, osteoarthritis and degenerative disc disease of the cervical spine.  Osteoarthritis and degenerative disc disease of the cervical spine.  Chronic insomnia manifested as difficulty staying asleep-may be secondary to primary insomnia, anxiety..  Anxiety.    Plan  Obtain EKG, urinalysis, CBC differential, CMP, fasting lipid profile, TSH, vitamin D level, vitamin B12 level, PSA, coagulation screen today.  I recommended that the patient receive 1 dose of Prevnar 20 within the next 6 months.  I referred the patient back to his orthopedist for further evaluation and treatment of the aforementioned chronic intermittent episodes of a popping sensation in the left shoulder.  I told the patient to decrease his alcohol intake if possible.  I recommended that the patient continue use of ibuprofen and I told him that he could take Voltaren gel and apply that to areas of pain every 6 hours as needed.  The patient will return for a complete physical examination in 1 year

## 2025-04-03 ASSESSMENT — PROMIS GLOBAL HEALTH SCALE
RATE_PHYSICAL_HEALTH: EXCELLENT
RATE_QUALITY_OF_LIFE: VERY GOOD
RATE_MENTAL_HEALTH: VERY GOOD
CARRYOUT_PHYSICAL_ACTIVITIES: COMPLETELY
RATE_AVERAGE_FATIGUE: MILD
EMOTIONAL_PROBLEMS: SOMETIMES
RATE_SOCIAL_SATISFACTION: GOOD
RATE_AVERAGE_PAIN: 3
CARRYOUT_SOCIAL_ACTIVITIES: VERY GOOD
RATE_GENERAL_HEALTH: VERY GOOD

## 2025-04-04 ENCOUNTER — APPOINTMENT (OUTPATIENT)
Dept: PRIMARY CARE | Facility: CLINIC | Age: 65
End: 2025-04-04
Payer: COMMERCIAL

## 2025-04-04 VITALS
DIASTOLIC BLOOD PRESSURE: 78 MMHG | BODY MASS INDEX: 28.36 KG/M2 | WEIGHT: 165.2 LBS | SYSTOLIC BLOOD PRESSURE: 110 MMHG | HEART RATE: 88 BPM

## 2025-04-04 DIAGNOSIS — N40.1 BPH WITH OBSTRUCTION/LOWER URINARY TRACT SYMPTOMS: ICD-10-CM

## 2025-04-04 DIAGNOSIS — T14.8XXA BRUISING: ICD-10-CM

## 2025-04-04 DIAGNOSIS — N13.8 BPH WITH OBSTRUCTION/LOWER URINARY TRACT SYMPTOMS: ICD-10-CM

## 2025-04-04 DIAGNOSIS — R07.89 OTHER CHEST PAIN: ICD-10-CM

## 2025-04-04 DIAGNOSIS — Z00.00 ROUTINE GENERAL MEDICAL EXAMINATION AT A HEALTH CARE FACILITY: Primary | ICD-10-CM

## 2025-04-04 DIAGNOSIS — R13.19 ESOPHAGEAL DYSPHAGIA: ICD-10-CM

## 2025-04-04 DIAGNOSIS — F41.9 ANXIETY DISORDER, UNSPECIFIED: ICD-10-CM

## 2025-04-04 DIAGNOSIS — R06.09 DOE (DYSPNEA ON EXERTION): ICD-10-CM

## 2025-04-04 LAB
POC APPEARANCE, URINE: CLEAR
POC BILIRUBIN, URINE: NEGATIVE
POC BLOOD, URINE: NEGATIVE
POC COLOR, URINE: YELLOW
POC GLUCOSE, URINE: NEGATIVE MG/DL
POC KETONES, URINE: NEGATIVE MG/DL
POC LEUKOCYTES, URINE: NEGATIVE
POC NITRITE,URINE: NEGATIVE
POC PH, URINE: 5.5 PH
POC PROTEIN, URINE: NEGATIVE MG/DL
POC SPECIFIC GRAVITY, URINE: 1.02
POC UROBILINOGEN, URINE: 0.2 EU/DL

## 2025-04-04 PROCEDURE — 1160F RVW MEDS BY RX/DR IN RCRD: CPT | Performed by: INTERNAL MEDICINE

## 2025-04-04 PROCEDURE — 93000 ELECTROCARDIOGRAM COMPLETE: CPT | Performed by: INTERNAL MEDICINE

## 2025-04-04 PROCEDURE — 99397 PER PM REEVAL EST PAT 65+ YR: CPT | Performed by: INTERNAL MEDICINE

## 2025-04-04 PROCEDURE — 1159F MED LIST DOCD IN RCRD: CPT | Performed by: INTERNAL MEDICINE

## 2025-04-04 PROCEDURE — 99214 OFFICE O/P EST MOD 30 MIN: CPT | Performed by: INTERNAL MEDICINE

## 2025-04-04 PROCEDURE — 81002 URINALYSIS NONAUTO W/O SCOPE: CPT | Performed by: INTERNAL MEDICINE

## 2025-04-04 RX ORDER — IBUPROFEN 200 MG
600 TABLET ORAL EVERY 6 HOURS PRN
Qty: 180 TABLET | Refills: 1 | Status: SHIPPED | OUTPATIENT
Start: 2025-04-04

## 2025-04-04 RX ORDER — CITALOPRAM 20 MG/1
40 TABLET, FILM COATED ORAL DAILY
Qty: 180 TABLET | Refills: 3 | Status: SHIPPED | OUTPATIENT
Start: 2025-04-04

## 2025-04-05 LAB
25(OH)D3+25(OH)D2 SERPL-MCNC: 34 NG/ML (ref 30–100)
ALBUMIN SERPL-MCNC: 4.5 G/DL (ref 3.6–5.1)
ALP SERPL-CCNC: 43 U/L (ref 35–144)
ALT SERPL-CCNC: 16 U/L (ref 9–46)
ANION GAP SERPL CALCULATED.4IONS-SCNC: 9 MMOL/L (CALC) (ref 7–17)
APTT PPP: 26 SEC (ref 23–32)
AST SERPL-CCNC: 18 U/L (ref 10–35)
BASOPHILS # BLD AUTO: 147 CELLS/UL (ref 0–200)
BASOPHILS NFR BLD AUTO: 2.3 %
BILIRUB SERPL-MCNC: 1.2 MG/DL (ref 0.2–1.2)
BUN SERPL-MCNC: 14 MG/DL (ref 7–25)
CALCIUM SERPL-MCNC: 9.6 MG/DL (ref 8.6–10.3)
CHLORIDE SERPL-SCNC: 108 MMOL/L (ref 98–110)
CHOLEST SERPL-MCNC: 200 MG/DL
CHOLEST/HDLC SERPL: 3 (CALC)
CO2 SERPL-SCNC: 24 MMOL/L (ref 20–32)
CREAT SERPL-MCNC: 0.96 MG/DL (ref 0.7–1.35)
CRP SERPL HS-MCNC: NORMAL MG/L
EGFRCR SERPLBLD CKD-EPI 2021: 88 ML/MIN/1.73M2
EOSINOPHIL # BLD AUTO: 346 CELLS/UL (ref 15–500)
EOSINOPHIL NFR BLD AUTO: 5.4 %
ERYTHROCYTE [DISTWIDTH] IN BLOOD BY AUTOMATED COUNT: 12.1 % (ref 11–15)
GLUCOSE SERPL-MCNC: 98 MG/DL (ref 65–139)
HCT VFR BLD AUTO: 47 % (ref 38.5–50)
HDLC SERPL-MCNC: 67 MG/DL
HGB BLD-MCNC: 16.4 G/DL (ref 13.2–17.1)
INR PPP: 1
LDLC SERPL CALC-MCNC: 111 MG/DL (CALC)
LYMPHOCYTES # BLD AUTO: 1299 CELLS/UL (ref 850–3900)
LYMPHOCYTES NFR BLD AUTO: 20.3 %
MCH RBC QN AUTO: 33.5 PG (ref 27–33)
MCHC RBC AUTO-ENTMCNC: 34.9 G/DL (ref 32–36)
MCV RBC AUTO: 95.9 FL (ref 80–100)
MONOCYTES # BLD AUTO: 480 CELLS/UL (ref 200–950)
MONOCYTES NFR BLD AUTO: 7.5 %
NEUTROPHILS # BLD AUTO: 4128 CELLS/UL (ref 1500–7800)
NEUTROPHILS NFR BLD AUTO: 64.5 %
NONHDLC SERPL-MCNC: 133 MG/DL (CALC)
PLATELET # BLD AUTO: 244 THOUSAND/UL (ref 140–400)
PMV BLD REES-ECKER: 10.6 FL (ref 7.5–12.5)
POTASSIUM SERPL-SCNC: 4.2 MMOL/L (ref 3.5–5.3)
PROT SERPL-MCNC: 6.6 G/DL (ref 6.1–8.1)
PROTHROMBIN TIME: 10.7 SEC (ref 9–11.5)
PSA SERPL-MCNC: 8.26 NG/ML
RBC # BLD AUTO: 4.9 MILLION/UL (ref 4.2–5.8)
SODIUM SERPL-SCNC: 141 MMOL/L (ref 135–146)
TRIGL SERPL-MCNC: 111 MG/DL
VIT B12 SERPL-MCNC: 441 PG/ML (ref 200–1100)
WBC # BLD AUTO: 6.4 THOUSAND/UL (ref 3.8–10.8)

## 2025-04-07 DIAGNOSIS — N13.8 BPH WITH OBSTRUCTION/LOWER URINARY TRACT SYMPTOMS: Primary | ICD-10-CM

## 2025-04-07 DIAGNOSIS — N40.1 BPH WITH OBSTRUCTION/LOWER URINARY TRACT SYMPTOMS: Primary | ICD-10-CM

## 2025-04-07 LAB
25(OH)D3+25(OH)D2 SERPL-MCNC: 34 NG/ML (ref 30–100)
ALBUMIN SERPL-MCNC: 4.5 G/DL (ref 3.6–5.1)
ALP SERPL-CCNC: 43 U/L (ref 35–144)
ALT SERPL-CCNC: 16 U/L (ref 9–46)
ANION GAP SERPL CALCULATED.4IONS-SCNC: 9 MMOL/L (CALC) (ref 7–17)
APTT PPP: 26 SEC (ref 23–32)
AST SERPL-CCNC: 18 U/L (ref 10–35)
BASOPHILS # BLD AUTO: 147 CELLS/UL (ref 0–200)
BASOPHILS NFR BLD AUTO: 2.3 %
BILIRUB SERPL-MCNC: 1.2 MG/DL (ref 0.2–1.2)
BUN SERPL-MCNC: 14 MG/DL (ref 7–25)
CALCIUM SERPL-MCNC: 9.6 MG/DL (ref 8.6–10.3)
CHLORIDE SERPL-SCNC: 108 MMOL/L (ref 98–110)
CHOLEST SERPL-MCNC: 200 MG/DL
CHOLEST/HDLC SERPL: 3 (CALC)
CO2 SERPL-SCNC: 24 MMOL/L (ref 20–32)
CREAT SERPL-MCNC: 0.96 MG/DL (ref 0.7–1.35)
CRP SERPL HS-MCNC: 0.3 MG/L
EGFRCR SERPLBLD CKD-EPI 2021: 88 ML/MIN/1.73M2
EOSINOPHIL # BLD AUTO: 346 CELLS/UL (ref 15–500)
EOSINOPHIL NFR BLD AUTO: 5.4 %
ERYTHROCYTE [DISTWIDTH] IN BLOOD BY AUTOMATED COUNT: 12.1 % (ref 11–15)
GLUCOSE SERPL-MCNC: 98 MG/DL (ref 65–139)
HCT VFR BLD AUTO: 47 % (ref 38.5–50)
HDLC SERPL-MCNC: 67 MG/DL
HGB BLD-MCNC: 16.4 G/DL (ref 13.2–17.1)
INR PPP: 1
LDLC SERPL CALC-MCNC: 111 MG/DL (CALC)
LYMPHOCYTES # BLD AUTO: 1299 CELLS/UL (ref 850–3900)
LYMPHOCYTES NFR BLD AUTO: 20.3 %
MCH RBC QN AUTO: 33.5 PG (ref 27–33)
MCHC RBC AUTO-ENTMCNC: 34.9 G/DL (ref 32–36)
MCV RBC AUTO: 95.9 FL (ref 80–100)
MONOCYTES # BLD AUTO: 480 CELLS/UL (ref 200–950)
MONOCYTES NFR BLD AUTO: 7.5 %
NEUTROPHILS # BLD AUTO: 4128 CELLS/UL (ref 1500–7800)
NEUTROPHILS NFR BLD AUTO: 64.5 %
NONHDLC SERPL-MCNC: 133 MG/DL (CALC)
PLATELET # BLD AUTO: 244 THOUSAND/UL (ref 140–400)
PMV BLD REES-ECKER: 10.6 FL (ref 7.5–12.5)
POTASSIUM SERPL-SCNC: 4.2 MMOL/L (ref 3.5–5.3)
PROT SERPL-MCNC: 6.6 G/DL (ref 6.1–8.1)
PROTHROMBIN TIME: 10.7 SEC (ref 9–11.5)
PSA SERPL-MCNC: 8.26 NG/ML
RBC # BLD AUTO: 4.9 MILLION/UL (ref 4.2–5.8)
SODIUM SERPL-SCNC: 141 MMOL/L (ref 135–146)
TRIGL SERPL-MCNC: 111 MG/DL
VIT B12 SERPL-MCNC: 441 PG/ML (ref 200–1100)
WBC # BLD AUTO: 6.4 THOUSAND/UL (ref 3.8–10.8)

## 2025-04-09 LAB — PSA SERPL-MCNC: 8.96 NG/ML

## 2025-04-14 ENCOUNTER — TELEPHONE (OUTPATIENT)
Dept: PRIMARY CARE | Facility: CLINIC | Age: 65
End: 2025-04-14
Payer: COMMERCIAL

## 2025-04-14 DIAGNOSIS — F41.8 OTHER SPECIFIED ANXIETY DISORDERS: Primary | ICD-10-CM

## 2025-04-14 RX ORDER — CITALOPRAM 20 MG/1
TABLET, FILM COATED ORAL
Qty: 180 TABLET | Refills: 3 | Status: SHIPPED | OUTPATIENT
Start: 2025-04-14 | End: 2025-04-18 | Stop reason: SDUPTHER

## 2025-04-16 ENCOUNTER — APPOINTMENT (OUTPATIENT)
Dept: RADIOLOGY | Facility: HOSPITAL | Age: 65
End: 2025-04-16
Payer: COMMERCIAL

## 2025-04-18 DIAGNOSIS — F41.8 OTHER SPECIFIED ANXIETY DISORDERS: ICD-10-CM

## 2025-04-18 RX ORDER — CITALOPRAM 20 MG/1
TABLET, FILM COATED ORAL
Qty: 180 TABLET | Refills: 3 | Status: SHIPPED | OUTPATIENT
Start: 2025-04-18

## 2025-04-20 ENCOUNTER — APPOINTMENT (OUTPATIENT)
Dept: RADIOLOGY | Facility: HOSPITAL | Age: 65
End: 2025-04-20
Payer: COMMERCIAL

## 2025-04-28 ENCOUNTER — HOSPITAL ENCOUNTER (OUTPATIENT)
Dept: RADIOLOGY | Facility: HOSPITAL | Age: 65
Discharge: HOME | End: 2025-04-28
Payer: COMMERCIAL

## 2025-04-28 VITALS — WEIGHT: 165.12 LBS | BODY MASS INDEX: 28.34 KG/M2

## 2025-04-28 DIAGNOSIS — N13.8 BPH WITH OBSTRUCTION/LOWER URINARY TRACT SYMPTOMS: ICD-10-CM

## 2025-04-28 DIAGNOSIS — N40.1 BPH WITH OBSTRUCTION/LOWER URINARY TRACT SYMPTOMS: ICD-10-CM

## 2025-04-28 PROCEDURE — 72197 MRI PELVIS W/O & W/DYE: CPT

## 2025-04-28 PROCEDURE — 76498 UNLISTED MR PROCEDURE: CPT | Performed by: RADIOLOGY

## 2025-04-28 PROCEDURE — 76498 UNLISTED MR PROCEDURE: CPT

## 2025-04-28 PROCEDURE — 2550000001 HC RX 255 CONTRASTS: Mod: JZ | Performed by: INTERNAL MEDICINE

## 2025-04-28 PROCEDURE — A9575 INJ GADOTERATE MEGLUMI 0.1ML: HCPCS | Mod: JZ | Performed by: INTERNAL MEDICINE

## 2025-04-28 PROCEDURE — 72197 MRI PELVIS W/O & W/DYE: CPT | Performed by: RADIOLOGY

## 2025-04-28 RX ORDER — GADOTERATE MEGLUMINE 376.9 MG/ML
15 INJECTION INTRAVENOUS
Status: COMPLETED | OUTPATIENT
Start: 2025-04-28 | End: 2025-04-28

## 2025-04-28 RX ADMIN — GADOTERATE MEGLUMINE 15 ML: 376.9 INJECTION INTRAVENOUS at 08:43

## 2025-04-29 DIAGNOSIS — C61 PROSTATE CANCER (MULTI): Primary | ICD-10-CM

## 2025-05-07 NOTE — PROGRESS NOTES
HPI:  Proc (2/22/2017): prostate biopsy Dr. Marcus Baum   Path: ASAP    Proc (10/3/17): bipolar TURP preformed by Dr. Conklin   Path: benign prostate tissue     Jose Newell is a 65 y.o. male referred by Dr. Zack Gomez for elevated PSA. Hx of BPH w/ LUTS, ED, hematuria. MRI Prostate (4/28/25): 73.04 ml, a PI-RADS 4 lesion in the right posteromedial mid PZ, no gross extracapsular extension, no enlarged pelvic lymph nodes. Reports mild urinary leakage. Endorses ED.     PSA: 8.96 (4/8/25)    MRI Prostate (4/28/25): 73.04 ml, a PI-RADS 4 lesion in the right posteromedial mid PZ, no gross extracapsular extension, no enlarged pelvic lymph nodes.    Review of Systems:  All systems reviewed. Anything negative noted in the HPI.    Physical Exam:  Vitals signs reviewed.  Constitutional:      Appearance: Well-developed.  HENT:     Head: Normocephalic and atraumatic.  Neck:     Musculoskeletal: Normal range of motion.  Pulmonary:     Effort: Pulmonary effort is normal.  Musculoskeletal: Normal range of motion.  Skin:     General: Skin is warm and dry.  Neurological:     Mental Status: Alert and oriented to person, place, and time.  Psychiatric:        Behavior: Behavior normal.        Thought Content: Thought content normal.        Judgment: Judgment normal.    Procedures:    Assessment/Plan   Jose Newell is a 65 y.o. male referred by Dr. Zack Gomez for elevated PSA. Hx of BPH w/ LUTS, ED, hematuria. MRI Prostate (4/28/25): 73.04 ml, a PI-RADS 4 lesion in the right posteromedial mid PZ, no gross extracapsular extension, no enlarged pelvic lymph nodes. Reports mild urinary leakage. Endorses ED. Management options including risks, benefits and alternatives discussed at length and all questions answered. Patient prefers to proceed with TP prostate biopsy.           Scribe Attestation:  By signing my name below, Delfina BUTTERFIELD, Rupeshibe   attest that this documentation has been prepared under the direction and in the  presence of Jose Garcia MD.

## 2025-05-08 ENCOUNTER — APPOINTMENT (OUTPATIENT)
Dept: UROLOGY | Facility: CLINIC | Age: 65
End: 2025-05-08
Payer: COMMERCIAL

## 2025-05-08 VITALS — TEMPERATURE: 96.9 F

## 2025-05-08 DIAGNOSIS — C61 PROSTATE CANCER (MULTI): ICD-10-CM

## 2025-05-08 DIAGNOSIS — R97.20 ELEVATED PSA: ICD-10-CM

## 2025-05-08 DIAGNOSIS — R97.20 HIGH PROSTATE SPECIFIC ANTIGEN (PSA): Primary | ICD-10-CM

## 2025-05-08 PROCEDURE — 51798 US URINE CAPACITY MEASURE: CPT | Performed by: STUDENT IN AN ORGANIZED HEALTH CARE EDUCATION/TRAINING PROGRAM

## 2025-05-08 PROCEDURE — 99204 OFFICE O/P NEW MOD 45 MIN: CPT | Performed by: STUDENT IN AN ORGANIZED HEALTH CARE EDUCATION/TRAINING PROGRAM

## 2025-05-08 PROCEDURE — 1126F AMNT PAIN NOTED NONE PRSNT: CPT | Performed by: STUDENT IN AN ORGANIZED HEALTH CARE EDUCATION/TRAINING PROGRAM

## 2025-05-08 PROCEDURE — 1159F MED LIST DOCD IN RCRD: CPT | Performed by: STUDENT IN AN ORGANIZED HEALTH CARE EDUCATION/TRAINING PROGRAM

## 2025-05-08 ASSESSMENT — PAIN SCALES - GENERAL: PAINLEVEL_OUTOF10: 0-NO PAIN

## 2025-05-09 PROBLEM — R97.20 ELEVATED PSA: Status: ACTIVE | Noted: 2025-05-08

## 2025-05-12 NOTE — PROGRESS NOTES
Subjective   Patient ID: Jose Newell is a 65 y.o. male who presents for preop testing prior to his planned prostate biopsy Padmaja 10, 2025    HPI   The patient reports no fever, cough, shortness of breath, chest pain, abdominal pain, nausea, vomiting, diarrhea, melena, hematochezia, change in urinary habits, sneezing, watery eyes  Review of Systems    Objective   There were no vitals taken for this visit.    Physical Exam  Lungs - clear to auscultation bilaterally.  Cardiac -  rate normal, rhythm regular, no murmurs, no JVD.   Abdomen - soft nondistended, normal active bowel sounds. Palpation revealed no masses.   Extremities - no peripheral edema  Assessment/Plan   Problem List Items Addressed This Visit           ICD-10-CM    BPH with obstruction/lower urinary tract symptoms N40.1, N13.8    Esophageal dysphagia R13.19    Urinary frequency R35.0    Elevated PSA - Primary R97.20     Assessment    Chronic intermittent paroxysms of sneezing, watery eyes-likely secondary to allergic rhinitis  Allergic rhinitis.  Chronic recurrent epistaxes from the right nostril area-unsure of etiology.  Schatzki's ring status post dilatation March 27, 2023  Gastroesophageal reflux.  Diverticulosis..  Internal hemorrhoids   PI-RADS 4 lesion  Chronic postvoid dribbling, occasional urinary frequency, intermittent urinary urgency-May be secondary to BPH, overactive bladder  BPH with bladder outlet obstruction status post TURP October 2017.  Erectile dysfunction.    Plan  Obtain CBC differential, CMP, PT/PTT today.  If above studies unremarkable, the patient will be at low risk for any cardiac complications from a prostate biopsy.  The patient will return for a follow-up visit as needed

## 2025-05-13 ENCOUNTER — APPOINTMENT (OUTPATIENT)
Dept: PRIMARY CARE | Facility: CLINIC | Age: 65
End: 2025-05-13
Payer: COMMERCIAL

## 2025-05-13 VITALS
WEIGHT: 161 LBS | SYSTOLIC BLOOD PRESSURE: 96 MMHG | TEMPERATURE: 98.4 F | HEART RATE: 88 BPM | DIASTOLIC BLOOD PRESSURE: 64 MMHG | BODY MASS INDEX: 27.64 KG/M2

## 2025-05-13 DIAGNOSIS — R97.20 ELEVATED PSA: Primary | ICD-10-CM

## 2025-05-13 DIAGNOSIS — N40.1 BPH WITH OBSTRUCTION/LOWER URINARY TRACT SYMPTOMS: ICD-10-CM

## 2025-05-13 DIAGNOSIS — N13.8 BPH WITH OBSTRUCTION/LOWER URINARY TRACT SYMPTOMS: ICD-10-CM

## 2025-05-13 DIAGNOSIS — R13.19 ESOPHAGEAL DYSPHAGIA: ICD-10-CM

## 2025-05-13 DIAGNOSIS — R35.0 URINARY FREQUENCY: ICD-10-CM

## 2025-05-13 LAB
ALBUMIN SERPL-MCNC: 4.3 G/DL (ref 3.6–5.1)
ALP SERPL-CCNC: 43 U/L (ref 35–144)
ALT SERPL-CCNC: 16 U/L (ref 9–46)
ANION GAP SERPL CALCULATED.4IONS-SCNC: 10 MMOL/L (CALC) (ref 7–17)
APTT PPP: 27 SEC (ref 23–32)
AST SERPL-CCNC: 18 U/L (ref 10–35)
BASOPHILS # BLD AUTO: 110 CELLS/UL (ref 0–200)
BASOPHILS NFR BLD AUTO: 1.9 %
BILIRUB SERPL-MCNC: 1.1 MG/DL (ref 0.2–1.2)
BUN SERPL-MCNC: 13 MG/DL (ref 7–25)
CALCIUM SERPL-MCNC: 9.1 MG/DL (ref 8.6–10.3)
CHLORIDE SERPL-SCNC: 108 MMOL/L (ref 98–110)
CO2 SERPL-SCNC: 21 MMOL/L (ref 20–32)
CREAT SERPL-MCNC: 0.97 MG/DL (ref 0.7–1.35)
EGFRCR SERPLBLD CKD-EPI 2021: 87 ML/MIN/1.73M2
EOSINOPHIL # BLD AUTO: 354 CELLS/UL (ref 15–500)
EOSINOPHIL NFR BLD AUTO: 6.1 %
ERYTHROCYTE [DISTWIDTH] IN BLOOD BY AUTOMATED COUNT: 12.4 % (ref 11–15)
GLUCOSE SERPL-MCNC: 109 MG/DL (ref 65–99)
HCT VFR BLD AUTO: 46.2 % (ref 38.5–50)
HGB BLD-MCNC: 15.6 G/DL (ref 13.2–17.1)
INR PPP: 1
LYMPHOCYTES # BLD AUTO: 1357 CELLS/UL (ref 850–3900)
LYMPHOCYTES NFR BLD AUTO: 23.4 %
MCH RBC QN AUTO: 32.9 PG (ref 27–33)
MCHC RBC AUTO-ENTMCNC: 33.8 G/DL (ref 32–36)
MCV RBC AUTO: 97.5 FL (ref 80–100)
MONOCYTES # BLD AUTO: 400 CELLS/UL (ref 200–950)
MONOCYTES NFR BLD AUTO: 6.9 %
NEUTROPHILS # BLD AUTO: 3579 CELLS/UL (ref 1500–7800)
NEUTROPHILS NFR BLD AUTO: 61.7 %
PLATELET # BLD AUTO: 231 THOUSAND/UL (ref 140–400)
PMV BLD REES-ECKER: 10.8 FL (ref 7.5–12.5)
POTASSIUM SERPL-SCNC: 4.2 MMOL/L (ref 3.5–5.3)
PROT SERPL-MCNC: 6.4 G/DL (ref 6.1–8.1)
PROTHROMBIN TIME: 10.4 SEC (ref 9–11.5)
RBC # BLD AUTO: 4.74 MILLION/UL (ref 4.2–5.8)
SODIUM SERPL-SCNC: 139 MMOL/L (ref 135–146)
WBC # BLD AUTO: 5.8 THOUSAND/UL (ref 3.8–10.8)

## 2025-05-13 PROCEDURE — 99213 OFFICE O/P EST LOW 20 MIN: CPT | Performed by: INTERNAL MEDICINE

## 2025-05-13 PROCEDURE — 1160F RVW MEDS BY RX/DR IN RCRD: CPT | Performed by: INTERNAL MEDICINE

## 2025-05-13 PROCEDURE — 1159F MED LIST DOCD IN RCRD: CPT | Performed by: INTERNAL MEDICINE

## 2025-05-22 ENCOUNTER — CLINICAL SUPPORT (OUTPATIENT)
Dept: PREADMISSION TESTING | Facility: HOSPITAL | Age: 65
End: 2025-05-22
Payer: COMMERCIAL

## 2025-05-22 VITALS — WEIGHT: 160 LBS | HEIGHT: 64 IN | BODY MASS INDEX: 27.31 KG/M2

## 2025-05-22 ASSESSMENT — ENCOUNTER SYMPTOMS
DIARRHEA: 0
ABDOMINAL PAIN: 0
COUGH: 0
CHILLS: 0
SHORTNESS OF BREATH: 0
DIFFICULTY URINATING: 0
FEVER: 0
RHINORRHEA: 0
NAUSEA: 0
VOMITING: 0

## 2025-05-22 NOTE — CPM/PAT NURSE NOTE
CPM/PAT Nurse Note      Name: Jose Newell (Jose Newell)  /Age: 1960/65 y.o.       Medical History[1]    Surgical History[2]    Patient Sexual activity questions deferred to the physician.    Family History[3]    Allergies[4]    Prior to Admission medications    Medication Sig Start Date End Date Taking? Authorizing Provider   betamethasone dipropionate 0.05 % cream Apply topically 2 times a day.   Yes Historical Provider, MD   busPIRone (Buspar) 15 mg tablet TAKE 1 TABLET BY MOUTH THREE TIMES A DAY 24  Yes Zack Gomez MD   citalopram (CeleXA) 20 mg tablet Take two tablets daily 25  Yes Zack Gomez MD   cyclobenzaprine (Flexeril) 5 mg tablet TAKE 1 TABLET BY MOUTH EVERY 8 HOURS 24  Yes Zack Gomez MD   fexofenadine (Allegra) 180 mg tablet Take 1 tablet (180 mg) by mouth once daily. 18  Yes Historical Provider, MD   glucosamine-chondroitin 500-400 mg tablet Take 2 tablets by mouth once daily.   Yes Historical Provider, MD   ibuprofen (AdviL) 200 mg tablet Take 3 tablets (600 mg) by mouth every 6 hours if needed for moderate pain (4 - 6). 25  Yes Zack Gomez MD   sildenafil (Revatio) 20 mg tablet TAKE 1 TO 5 TABLETS BY MOUTH ON DEMAND AS NEEDED FOR SEXUAL ACTIVITY 25  Yes Zack Gomez MD   traZODone (Desyrel) 50 mg tablet TAKE 3-4 TABLETS BY MOUTH EVERY DAY AT BEDTIME.  Patient taking differently: Take 3 tablets (150 mg) by mouth once daily at bedtime. 2/3/25  Yes Zack Gomez MD   triamcinolone (Kenalog) 0.1 % cream Apply 1 Film topically 2 times a day. 12  Yes Historical Provider, MD   vitamin-B complex split tablet Take 1 tablet (2 half tablet) by mouth once daily. 21  Yes Historical Provider, MD   alclometasone (Aclovate) 0.05 % cream Apply topically.  Patient not taking: Reported on 2025   Historical Provider, MD   diclofenac sodium 1 % kit Place on the skin. 12   Historical Provider, MD NGUYỄN ROS:   Constitutional:    no  fever   no chills  Neuro/Psych:   Eyes:    use of corrective lenses (patient instructed to wear glasses the day of surgery)  Ears:    no ear pain  Nose:    no nasal discharge   no epistaxis  Mouth:   Throat:    no throat pain  Neck:   Cardio:    no chest pain  Respiratory:    no cough   no shortness of breath  Endocrine:   GI:    no abdominal pain   no diarrhea   no nausea   no vomiting  :    no difficulty urinating  Musculoskeletal:   Hematologic:   Skin:      DASI Risk Score    No data to display       Caprini DVT Assessment    No data to display       Modified Frailty Index    No data to display       [id number] Stroke Risk Points  Current as of just now        N/A 0 to 9 Points:      Last Change: N/A          The [id number] risk score (Lip KEESHA, et al. 2009. © 2010 [employer]) quantifies the risk of stroke for a patient with atrial fibrillation. For patients without atrial fibrillation or under the age of 18 this score appears as N/A. Higher score values generally indicate higher risk of stroke.        This score is not applicable to this patient. Components are not calculated.          Revised Cardiac Risk Index    No data to display       Apfel Simplified Score    No data to display       Risk Analysis Index Results This Encounter    No data found in the last 10 encounters.       Stop Bang Score      Flowsheet Row Clinical Support from 5/22/2025 in [facility]   Do you snore loudly? 1 filed at 05/22/2025 1508   Do you often feel tired or fatigued after your sleep? 0 filed at 05/22/2025 1508   Has anyone ever observed you stop breathing in your sleep? 0 filed at 05/22/2025 1508   Do you have or are you being treated for high blood pressure? 0 filed at 05/22/2025 1508   Recent BMI (Calculated) 27.8 filed at 05/22/2025 1508   Is BMI greater than 35 kg/m2? 0=No filed at 05/22/2025 1508   Age older than 50 years old? 1=Yes filed at 05/22/2025 1508   Is your neck  circumference greater than 17 inches (Male) or 16 inches (Female)? 0 filed at 05/22/2025 1508   Gender - Male 1=Yes filed at 05/22/2025 1508   STOP-BANG Total Score 3 filed at 05/22/2025 1508          Prodigy: High Risk  Total Score: 16              Prodigy Age Score      Prodigy Gender Score          ARISCAT Score for Postoperative Pulmonary Complications    No data to display       Don Perioperative Risk for Myocardial Infarction or Cardiac Arrest (KAM)    No data to display         Nurse Plan of Action:                [1]   Past Medical History:  Diagnosis Date    Allergic     Anxiety     Benign prostatic hyperplasia 6/2017    Disease of jaws, unspecified 05/22/2019    Disorder of jaw    Elevated PSA 6/2017    Joint pain     Personal history of other diseases of the digestive system 05/22/2019    History of esophageal reflux    Rotator cuff arthropathy, left 2023    Skin disorder     contact dermatitis   [2]   Past Surgical History:  Procedure Laterality Date    PROSTATE SURGERY  7/2017    TONSILLECTOMY  01/06/2017    Tonsillectomy   [3]   Family History  Problem Relation Name Age of Onset    Alzheimer's disease Mother      Lung cancer Father David D.     Cancer Father David D.    [4]   Allergies  Allergen Reactions    Amoxicillin Unknown

## 2025-05-22 NOTE — PERIOPERATIVE NURSING NOTE
PAT PRE-OPERATIVE INSTRUCTIONS    [facility]  [address] [address]  [phone]    Please let your surgeon know if:      You develop:  Open sores, shingles, burning or painful urination as these may increase your risk of an infection.   Fever=100.4 or greater   New or worsening cold or flu symptoms ( cough, shortness of breath, sore throat, respiratory distress, headache, fatigue, GI symptoms)   You no longer wish to have the surgery.   Any other personal circumstances change that may lead to the need to cancel or defer this surgery-such as being sick or getting admitted to any hospital within one week of your planned procedure.    Your contact details change, such as a change of address or phone number.    Starting now:     Please DO NOT drink alcohol or smoke for 24 hours before surgery. It is well known that quitting smoking can make a huge difference to your health and recovery from surgery. The longer you abstain from smoking, the better your chances of a healthy recovery. If you need help with quitting, call 1-800-QUIT-NOW to be connected to a trained counselor who will discuss the best methods to help you quit.     Before your surgery:    Please stop all supplements/ vitamins 7 days prior to surgery (or as directed by your surgeon).   Please stop taking NSAID pain medicine such as Advil, Ibuprofen, and Motrin 7 days before surgery.    If you develop any fever, cough, cold, rashes, cuts, scratches, scrapes, urinary symptoms or infection anywhere on your body (including teeth and gums) prior to surgery, please call your surgeon’s office as soon as possible. This may require treatment to reduce the chance of cancellation on the day of surgery.    The day before your surgery:   DIET- Do not eat any food after MIDNIGHT.   Get a good night’s rest.  Use the special soap for bathing if you have been instructed to use one.    Scheduled surgery times may change  and you will be notified if this occurs - please check your personal voicemail for any updates.     On the morning of surgery:   Wear comfortable, loose fitting clothes which open in the front.   Shower and please do not wear moisturizers, creams, lotions, deodorants, makeup or perfume.    Please bring with you to surgery:   Photo ID and insurance card   Current list of medicines and allergies   Pacemaker/ Defibrillator/Heart stent cards as well as remote controls for implanted devices    CPAP machine and mask    Slings/ splints/ crutches   A copy of your complete advanced directive/DHPOA.    Please do NOT bring with you to surgery:   All jewelry and valuables should be left at home.   Prosthetic devices such as contact lenses, glasses, hearing aids, dentures, eyelash extensions, hairpins and body piercings must be removed prior to going in to the surgical suite. If you have a case for these items, please bring it with you on surgery day.    *Patients under the age of 18: A responsible adult must be present and remaining in the building throughout the surgical visit.    After outpatient surgery:   A responsible adult MUST accompany you at the time of discharge and stay with you for 24 hours after your surgery. You may NOT drive yourself home after surgery.    Do not drive, operate machinery, make critical decisions or do activities that require co-ordination or balance until after a night’s sleep.   Do not drink alcoholic beverages for 24 hours.   Instructions for resuming your medications will be provided by your surgeon.    CALL YOUR DOCTOR AFTER SURGERY IF YOU HAVE:     Chills and/or a fever of 101° F or higher.    Redness, swelling, pus or drainage from your surgical wound or a bad smell from the wound.    Lightheadedness, fainting or confusion.    Persistent vomiting (throwing up) and are not able to eat or drink for 12 hours.    Three or more loose, watery bowel movements in 24 hours (diarrhea).   Difficulty  or pain while urinating( after non-urological surgery)    Pain and swelling in your legs, especially if it is only on one side.    Difficulty breathing or are breathing faster than normal.    Any new concerning symptoms.      Reviewed pre-op instructions with patient, states understanding and denies further questions at this time.      Take Care

## 2025-06-10 ENCOUNTER — ANESTHESIA (OUTPATIENT)
Dept: OPERATING ROOM | Facility: HOSPITAL | Age: 65
End: 2025-06-10
Payer: COMMERCIAL

## 2025-06-10 ENCOUNTER — ANESTHESIA EVENT (OUTPATIENT)
Dept: OPERATING ROOM | Facility: HOSPITAL | Age: 65
End: 2025-06-10
Payer: COMMERCIAL

## 2025-06-10 ENCOUNTER — HOSPITAL ENCOUNTER (OUTPATIENT)
Facility: HOSPITAL | Age: 65
Setting detail: OUTPATIENT SURGERY
Discharge: HOME | End: 2025-06-10
Attending: STUDENT IN AN ORGANIZED HEALTH CARE EDUCATION/TRAINING PROGRAM | Admitting: STUDENT IN AN ORGANIZED HEALTH CARE EDUCATION/TRAINING PROGRAM
Payer: COMMERCIAL

## 2025-06-10 VITALS
TEMPERATURE: 97.2 F | DIASTOLIC BLOOD PRESSURE: 81 MMHG | HEART RATE: 62 BPM | OXYGEN SATURATION: 94 % | BODY MASS INDEX: 28.23 KG/M2 | HEIGHT: 64 IN | RESPIRATION RATE: 16 BRPM | SYSTOLIC BLOOD PRESSURE: 111 MMHG | WEIGHT: 165.34 LBS

## 2025-06-10 DIAGNOSIS — R97.20 ELEVATED PSA: ICD-10-CM

## 2025-06-10 DIAGNOSIS — R97.20 HIGH PROSTATE SPECIFIC ANTIGEN (PSA): Primary | ICD-10-CM

## 2025-06-10 PROCEDURE — 3700000001 HC GENERAL ANESTHESIA TIME - INITIAL BASE CHARGE: Performed by: STUDENT IN AN ORGANIZED HEALTH CARE EDUCATION/TRAINING PROGRAM

## 2025-06-10 PROCEDURE — 7100000010 HC PHASE TWO TIME - EACH INCREMENTAL 1 MINUTE: Performed by: STUDENT IN AN ORGANIZED HEALTH CARE EDUCATION/TRAINING PROGRAM

## 2025-06-10 PROCEDURE — 7100000002 HC RECOVERY ROOM TIME - EACH INCREMENTAL 1 MINUTE: Performed by: STUDENT IN AN ORGANIZED HEALTH CARE EDUCATION/TRAINING PROGRAM

## 2025-06-10 PROCEDURE — C1819 TISSUE LOCALIZATION-EXCISION: HCPCS | Performed by: STUDENT IN AN ORGANIZED HEALTH CARE EDUCATION/TRAINING PROGRAM

## 2025-06-10 PROCEDURE — 55700 PR PROSTATE NEEDLE BIOPSY ANY APPROACH: CPT | Performed by: STUDENT IN AN ORGANIZED HEALTH CARE EDUCATION/TRAINING PROGRAM

## 2025-06-10 PROCEDURE — 2500000004 HC RX 250 GENERAL PHARMACY W/ HCPCS (ALT 636 FOR OP/ED): Performed by: STUDENT IN AN ORGANIZED HEALTH CARE EDUCATION/TRAINING PROGRAM

## 2025-06-10 PROCEDURE — A55706 PR BIOPSY OF PROSTATE,NEEDLE,TRANSPERINEAL

## 2025-06-10 PROCEDURE — 7100000001 HC RECOVERY ROOM TIME - INITIAL BASE CHARGE: Performed by: STUDENT IN AN ORGANIZED HEALTH CARE EDUCATION/TRAINING PROGRAM

## 2025-06-10 PROCEDURE — 3600000002 HC OR TIME - INITIAL BASE CHARGE - PROCEDURE LEVEL TWO: Performed by: STUDENT IN AN ORGANIZED HEALTH CARE EDUCATION/TRAINING PROGRAM

## 2025-06-10 PROCEDURE — 88305 TISSUE EXAM BY PATHOLOGIST: CPT | Mod: TC,SUR,BEALAB | Performed by: STUDENT IN AN ORGANIZED HEALTH CARE EDUCATION/TRAINING PROGRAM

## 2025-06-10 PROCEDURE — 3700000002 HC GENERAL ANESTHESIA TIME - EACH INCREMENTAL 1 MINUTE: Performed by: STUDENT IN AN ORGANIZED HEALTH CARE EDUCATION/TRAINING PROGRAM

## 2025-06-10 PROCEDURE — 2720000007 HC OR 272 NO HCPCS: Performed by: STUDENT IN AN ORGANIZED HEALTH CARE EDUCATION/TRAINING PROGRAM

## 2025-06-10 PROCEDURE — 7100000009 HC PHASE TWO TIME - INITIAL BASE CHARGE: Performed by: STUDENT IN AN ORGANIZED HEALTH CARE EDUCATION/TRAINING PROGRAM

## 2025-06-10 PROCEDURE — 2500000004 HC RX 250 GENERAL PHARMACY W/ HCPCS (ALT 636 FOR OP/ED)

## 2025-06-10 PROCEDURE — 3600000007 HC OR TIME - EACH INCREMENTAL 1 MINUTE - PROCEDURE LEVEL TWO: Performed by: STUDENT IN AN ORGANIZED HEALTH CARE EDUCATION/TRAINING PROGRAM

## 2025-06-10 PROCEDURE — 76872 US TRANSRECTAL: CPT | Performed by: STUDENT IN AN ORGANIZED HEALTH CARE EDUCATION/TRAINING PROGRAM

## 2025-06-10 PROCEDURE — 88305 TISSUE EXAM BY PATHOLOGIST: CPT | Performed by: PATHOLOGY

## 2025-06-10 PROCEDURE — A55706 PR BIOPSY OF PROSTATE,NEEDLE,TRANSPERINEAL: Performed by: ANESTHESIOLOGY

## 2025-06-10 RX ORDER — MIDAZOLAM HYDROCHLORIDE 1 MG/ML
INJECTION, SOLUTION INTRAMUSCULAR; INTRAVENOUS AS NEEDED
Status: DISCONTINUED | OUTPATIENT
Start: 2025-06-10 | End: 2025-06-10

## 2025-06-10 RX ORDER — ONDANSETRON HYDROCHLORIDE 2 MG/ML
4 INJECTION, SOLUTION INTRAVENOUS ONCE AS NEEDED
Status: DISCONTINUED | OUTPATIENT
Start: 2025-06-10 | End: 2025-06-10 | Stop reason: HOSPADM

## 2025-06-10 RX ORDER — LABETALOL HYDROCHLORIDE 5 MG/ML
5 INJECTION, SOLUTION INTRAVENOUS ONCE AS NEEDED
Status: DISCONTINUED | OUTPATIENT
Start: 2025-06-10 | End: 2025-06-10 | Stop reason: HOSPADM

## 2025-06-10 RX ORDER — BUPIVACAINE HYDROCHLORIDE 2.5 MG/ML
INJECTION, SOLUTION INFILTRATION; PERINEURAL AS NEEDED
Status: DISCONTINUED | OUTPATIENT
Start: 2025-06-10 | End: 2025-06-10 | Stop reason: HOSPADM

## 2025-06-10 RX ORDER — KETOROLAC TROMETHAMINE 30 MG/ML
INJECTION, SOLUTION INTRAMUSCULAR; INTRAVENOUS AS NEEDED
Status: DISCONTINUED | OUTPATIENT
Start: 2025-06-10 | End: 2025-06-10

## 2025-06-10 RX ORDER — FENTANYL CITRATE 50 UG/ML
50 INJECTION, SOLUTION INTRAMUSCULAR; INTRAVENOUS EVERY 5 MIN PRN
Status: DISCONTINUED | OUTPATIENT
Start: 2025-06-10 | End: 2025-06-10 | Stop reason: HOSPADM

## 2025-06-10 RX ORDER — LIDOCAINE HYDROCHLORIDE 10 MG/ML
INJECTION, SOLUTION EPIDURAL; INFILTRATION; INTRACAUDAL; PERINEURAL AS NEEDED
Status: DISCONTINUED | OUTPATIENT
Start: 2025-06-10 | End: 2025-06-10

## 2025-06-10 RX ORDER — SODIUM CHLORIDE, SODIUM LACTATE, POTASSIUM CHLORIDE, CALCIUM CHLORIDE 600; 310; 30; 20 MG/100ML; MG/100ML; MG/100ML; MG/100ML
50 INJECTION, SOLUTION INTRAVENOUS CONTINUOUS
Status: DISCONTINUED | OUTPATIENT
Start: 2025-06-10 | End: 2025-06-10 | Stop reason: HOSPADM

## 2025-06-10 RX ORDER — MEPERIDINE HYDROCHLORIDE 25 MG/ML
12.5 INJECTION INTRAMUSCULAR; INTRAVENOUS; SUBCUTANEOUS EVERY 10 MIN PRN
Status: DISCONTINUED | OUTPATIENT
Start: 2025-06-10 | End: 2025-06-10 | Stop reason: HOSPADM

## 2025-06-10 RX ORDER — PROPOFOL 10 MG/ML
INJECTION, EMULSION INTRAVENOUS AS NEEDED
Status: DISCONTINUED | OUTPATIENT
Start: 2025-06-10 | End: 2025-06-10

## 2025-06-10 RX ORDER — ONDANSETRON HYDROCHLORIDE 2 MG/ML
INJECTION, SOLUTION INTRAVENOUS AS NEEDED
Status: DISCONTINUED | OUTPATIENT
Start: 2025-06-10 | End: 2025-06-10

## 2025-06-10 RX ORDER — FENTANYL CITRATE 50 UG/ML
INJECTION, SOLUTION INTRAMUSCULAR; INTRAVENOUS AS NEEDED
Status: DISCONTINUED | OUTPATIENT
Start: 2025-06-10 | End: 2025-06-10

## 2025-06-10 RX ORDER — CEFTRIAXONE 2 G/50ML
2 INJECTION, SOLUTION INTRAVENOUS ONCE
Status: COMPLETED | OUTPATIENT
Start: 2025-06-10 | End: 2025-06-10

## 2025-06-10 RX ORDER — OXYCODONE HYDROCHLORIDE 5 MG/1
5 TABLET ORAL EVERY 4 HOURS PRN
Status: DISCONTINUED | OUTPATIENT
Start: 2025-06-10 | End: 2025-06-10 | Stop reason: HOSPADM

## 2025-06-10 RX ORDER — LIDOCAINE HYDROCHLORIDE 10 MG/ML
0.1 INJECTION, SOLUTION EPIDURAL; INFILTRATION; INTRACAUDAL; PERINEURAL ONCE
Status: DISCONTINUED | OUTPATIENT
Start: 2025-06-10 | End: 2025-06-10 | Stop reason: HOSPADM

## 2025-06-10 RX ORDER — FENTANYL CITRATE 50 UG/ML
25 INJECTION, SOLUTION INTRAMUSCULAR; INTRAVENOUS EVERY 5 MIN PRN
Status: DISCONTINUED | OUTPATIENT
Start: 2025-06-10 | End: 2025-06-10 | Stop reason: HOSPADM

## 2025-06-10 RX ADMIN — FENTANYL CITRATE 25 MCG: 0.05 INJECTION, SOLUTION INTRAMUSCULAR; INTRAVENOUS at 07:45

## 2025-06-10 RX ADMIN — MIDAZOLAM 1 MG: 1 INJECTION INTRAMUSCULAR; INTRAVENOUS at 07:45

## 2025-06-10 RX ADMIN — PROPOFOL 50 MG: 10 INJECTION, EMULSION INTRAVENOUS at 07:46

## 2025-06-10 RX ADMIN — PROPOFOL 20 MG: 10 INJECTION, EMULSION INTRAVENOUS at 07:49

## 2025-06-10 RX ADMIN — PROPOFOL 40 MG: 10 INJECTION, EMULSION INTRAVENOUS at 07:57

## 2025-06-10 RX ADMIN — KETOROLAC TROMETHAMINE 30 MG: 30 INJECTION, SOLUTION INTRAMUSCULAR at 08:03

## 2025-06-10 RX ADMIN — SODIUM CHLORIDE, POTASSIUM CHLORIDE, SODIUM LACTATE AND CALCIUM CHLORIDE: 600; 310; 30; 20 INJECTION, SOLUTION INTRAVENOUS at 07:39

## 2025-06-10 RX ADMIN — MIDAZOLAM 1 MG: 1 INJECTION INTRAMUSCULAR; INTRAVENOUS at 07:41

## 2025-06-10 RX ADMIN — CEFTRIAXONE 2 G: 2 INJECTION, SOLUTION INTRAVENOUS at 07:39

## 2025-06-10 RX ADMIN — LIDOCAINE HYDROCHLORIDE 5 ML: 10 INJECTION, SOLUTION EPIDURAL; INFILTRATION; INTRACAUDAL; PERINEURAL at 07:46

## 2025-06-10 RX ADMIN — PROPOFOL 40 MG: 10 INJECTION, EMULSION INTRAVENOUS at 07:53

## 2025-06-10 RX ADMIN — ONDANSETRON 4 MG: 2 INJECTION, SOLUTION INTRAMUSCULAR; INTRAVENOUS at 08:01

## 2025-06-10 SDOH — HEALTH STABILITY: MENTAL HEALTH: CURRENT SMOKER: 0

## 2025-06-10 ASSESSMENT — COLUMBIA-SUICIDE SEVERITY RATING SCALE - C-SSRS
2. HAVE YOU ACTUALLY HAD ANY THOUGHTS OF KILLING YOURSELF?: NO
6. HAVE YOU EVER DONE ANYTHING, STARTED TO DO ANYTHING, OR PREPARED TO DO ANYTHING TO END YOUR LIFE?: NO
1. IN THE PAST MONTH, HAVE YOU WISHED YOU WERE DEAD OR WISHED YOU COULD GO TO SLEEP AND NOT WAKE UP?: NO

## 2025-06-10 ASSESSMENT — PAIN - FUNCTIONAL ASSESSMENT
PAIN_FUNCTIONAL_ASSESSMENT: 0-10

## 2025-06-10 ASSESSMENT — PAIN SCALES - GENERAL
PAINLEVEL_OUTOF10: 0 - NO PAIN

## 2025-06-10 NOTE — H&P
"History Of Present Illness  Jose Newell is a 65 y.o. male presenting with elevated psa.     Past Medical History  Medical History[1]    Surgical History  Surgical History[2]     Social History  He reports that he has never smoked. He has never used smokeless tobacco. He reports current alcohol use of about 15.0 standard drinks of alcohol per week. He reports that he does not use drugs.    Family History  Family History[3]     Allergies  Amoxicillin    Review of Systems     Physical Exam     Last Recorded Vitals  Blood pressure 116/77, pulse 73, temperature 36.7 °C (98.1 °F), temperature source Temporal, resp. rate 14, height 1.626 m (5' 4\"), weight 75 kg (165 lb 5.5 oz), SpO2 96%.    Relevant Results             Assessment & Plan  Elevated PSA    High prostate specific antigen (PSA)      biopsy    I spent  minutes in the professional and overall care of this patient.      Jose Garcia MD         [1]   Past Medical History:  Diagnosis Date    Allergic     Anxiety     Benign prostatic hyperplasia 6/2017    Disease of jaws, unspecified 05/22/2019    Disorder of jaw    Elevated PSA 6/2017    Joint pain     Personal history of other diseases of the digestive system 05/22/2019    History of esophageal reflux    Rotator cuff arthropathy, left 2023    Skin disorder     contact dermatitis   [2]   Past Surgical History:  Procedure Laterality Date    PROSTATE SURGERY  7/2017    TONSILLECTOMY  01/06/2017    Tonsillectomy   [3]   Family History  Problem Relation Name Age of Onset    Alzheimer's disease Mother      Lung cancer Father David BROWN     Cancer Father David BROWN      "

## 2025-06-10 NOTE — ANESTHESIA PREPROCEDURE EVALUATION
Patient: Jose Newell    Procedure Information       Date/Time: 06/10/25 0745    Procedure: **Patient requesting early case** Biopsy Prostate ( fortec/uronav/precisionpoint/bmc ) (Bilateral)    Location: [facility] / [facility]    Surgeons: Jose Garcia MD            Relevant Problems   Neuro   (+) Anxiety   (+) Cervical radiculopathy      GI   (+) Esophageal dysphagia      /Renal   (+) BPH with obstruction/lower urinary tract symptoms      Musculoskeletal   (+) Primary osteoarthritis of right knee      Skin   (+) Eczema       Clinical information reviewed:   Tobacco  Allergies  Meds   Med Hx  Surg Hx   Fam Hx          NPO Detail:  No data recorded     Physical Exam    Airway  Mallampati: II  TM distance: >3 FB  Neck ROM: full     Cardiovascular    Dental    Pulmonary    Abdominal            Anesthesia Plan    History of general anesthesia?: yes  History of complications of general anesthesia?: no    ASA 2     MAC     The patient is not a current smoker.    intravenous induction   Anesthetic plan and risks discussed with patient.    Plan discussed with CRNA.

## 2025-06-10 NOTE — DISCHARGE INSTRUCTIONS
Dr. Garcia - [facility]  Phone: [phone]    Follow-Up Information    Following your Prostate Biopsy, please follow up with Dr. Garcia as scheduled    Medication  Please take the medications as prescribed by your doctor. Tylenol or non-steroids! anti-inflammatory medications (such as Aleve®) should relieve mild pain and discomfort. Resume the usual medications you took before surgery unless instructed otherwise.    Resuming Activities and Driving  *NO Driving on the day of surgery  *You may resume driving the day after surgery  *You may resume normal activities as tolerated  *You may shower     Diet  You may resume your normal diet once at home, with no additional considerations.    Expected Signs and Symptoms  You may have a small amount of bleeding with urination on occasion. This may be accompanied with small blood clots. This is normal and should be relieved by increasing your fluid intake.  You may experience some mild burning and discomfort during urination. This is normal and should subside in one to two weeks.      When to Call Your Doctor - Dr. Garcia [phone]  Please call the office immediately if any of the following symptoms appear:    *Inability to eat, drink, or take medication  *Persistent Nausea or Vomiting  *Fever over 100.4 degrees F. (38 degrees C.)    Please call 9-1-1 if you experience any of the following:  *Chest Pain, Shortness of Breath or Difficulty Breathing  *Sudden one sided weakness/slurred speech/ any signs or symptoms of a stroke  *Severe headache or visual disturbance    Additional Home-Going Instructions for Adults Who Have Had Anesthesia or Sedatives:    The anesthetics, sedatives and pain killers which were given to you will be acting in your body for the next 24 hours.  This may cause you to feel sleepy.  This feeling will slowly wear off.    For the next 24 hours you SHOULD NOT:  *Drive a car, or operate machinery or power tools  *Drink any form of alcohol  (including beer or wine)  *Make any important Decisions

## 2025-06-10 NOTE — ANESTHESIA POSTPROCEDURE EVALUATION
Patient: Jose Newell    Procedure Summary       Date: 06/10/25 Room / Location: [facility] / [facility]    Anesthesia Start: 0744 Anesthesia Stop: 0811    Procedure: **Patient requesting early case** Biopsy Prostate ( fortec/uronav/precisionpoint/bmc ) (Bilateral) Diagnosis:       High prostate specific antigen (PSA)      Elevated PSA      (High prostate specific antigen (PSA) [R97.20])      (Elevated PSA [R97.20])    Surgeons: Jose Garcia MD Responsible Provider: He Bill MD    Anesthesia Type: MAC ASA Status: 2            Anesthesia Type: MAC    Vitals Value Taken Time   /69 06/10/25 08:25   Temp 36.3 °C (97.3 °F) 06/10/25 08:08   Pulse 70 06/10/25 08:25   Resp 18 06/10/25 08:25   SpO2 94 % 06/10/25 08:25       Anesthesia Post Evaluation    Patient location during evaluation: PACU  Patient participation: complete - patient participated  Level of consciousness: awake  Pain management: adequate  Airway patency: patent  Cardiovascular status: acceptable  Respiratory status: acceptable  Hydration status: acceptable  Postoperative Nausea and Vomiting: none        There were no known notable events for this encounter.

## 2025-06-10 NOTE — OP NOTE
**Patient requesting early case** Biopsy Prostate ( fortec/uronav/precisionpoint/bmc ) (B) Operative Note     Date: 6/10/2025  OR Location: [facility]    Name: Jose Newell, : 1960, Age: 65 y.o., MRN: [id number], Sex: male    Diagnosis  Pre-op Diagnosis      * High prostate specific antigen (PSA) [R97.20]     * Elevated PSA [R97.20] Post-op Diagnosis     * High prostate specific antigen (PSA) [R97.20]     * Elevated PSA [R97.20]     Procedures  **Patient requesting early case** Biopsy Prostate ( fortec/uronav/precisionpoint/bmc )  56646 - IN BX PROSTATE STRTCTC SATURATION SAMPLING IMG GID    IN PROSTATE NEEDLE BIOPSY ANY APPROACH [77086]  CHG US TRANSRECTAL [[id number]]  Surgeons      * Jose Garcia - Primary    Resident/Fellow/Other Assistant:  Surgeons and Role:  * No surgeons found with a matching role *    Staff:   Circulator: Mary Mendoza Person: Annette    Anesthesia Staff: Anesthesiologist: He Bill MD  CRNA: DAIN Beasley-CRNA        Estimated Blood Loss (ml)  5.   Specimen(s) Removed  Removed region of interest and systematic biopsies.   Drain(s)  None.   Implant(s)  None.   Complications  None.   Indications (History)  Elevated PSA.   Findings of Procedure  70g prostate, no songraphic lesions.   Description of Procedure  After administration of anesthesia, a prostate block was performed and transrectal ultrasound. Transperineal biopsies taken from region of interest and systematic template performed using the precision point device.

## 2025-06-22 DIAGNOSIS — S29.019A STRAIN OF MUSCLE AND TENDON OF UNSPECIFIED WALL OF THORAX, INITIAL ENCOUNTER: ICD-10-CM

## 2025-06-23 LAB
LAB AP BLOCK FOR ADDITIONAL STUDIES: NORMAL
LABORATORY COMMENT REPORT: NORMAL
PATH REPORT.FINAL DX SPEC: NORMAL
PATH REPORT.GROSS SPEC: NORMAL
PATH REPORT.RELEVANT HX SPEC: NORMAL
PATH REPORT.TOTAL CANCER: NORMAL

## 2025-06-23 RX ORDER — CYCLOBENZAPRINE HCL 5 MG
5 TABLET ORAL EVERY 8 HOURS
Qty: 30 TABLET | Refills: 1 | Status: SHIPPED | OUTPATIENT
Start: 2025-06-23

## 2025-07-02 PROBLEM — C61 PROSTATE CANCER (MULTI): Status: ACTIVE | Noted: 2025-07-02

## 2025-07-02 NOTE — PROGRESS NOTES
HPI:  Proc (2/22/2017): prostate biopsy Dr. Marcus Baum   Path: ASAP    Proc (10/3/17): bipolar TURP preformed by Dr. Conklin   Path: benign prostate tissue     Proc (6/10/25): TP prostate biopsy   Path: prostatic adenocarcinoma, CAN#1 GG2 (Nathaniel score 3+4=7), 3/6 cores (50% of tissue), pattern 4 (5%), focal cribriform positive     Jose Newell is a 65 y.o. male referred by Dr. Zack Gomez for elevated PSA. Hx of BPH w/ LUTS, ED, hematuria. MRI Prostate (4/28/25) showed 73.04 ml, a PI-RADS 4 lesion in the right posteromedial mid PZ, no gross extracapsular extension, no enlarged pelvic lymph nodes. S/p TP prostate biopsy (6/10/25) with pathology showing prostatic adenocarcinoma, CAN#1 GG2 (Nathaniel score 3+4=7), 3/6 cores (50% of tissue), pattern 4 (5%), focal cribriform positive. Endorses ED. S/p biopsy reports hematuria and hematospermia.     PSA: 8.96 (4/8/25)    MRI Prostate (4/28/25): 73.04 ml, a PI-RADS 4 lesion in the right posteromedial mid PZ, no gross extracapsular extension, no enlarged pelvic lymph nodes.    Review of Systems:  All systems reviewed. Anything negative noted in the HPI.    Physical Exam:  Vitals signs reviewed.  Constitutional:      Appearance: Well-developed.  HENT:     Head: Normocephalic and atraumatic.  Neck:     Musculoskeletal: Normal range of motion.  Pulmonary:     Effort: Pulmonary effort is normal.  Musculoskeletal: Normal range of motion.  Skin:     General: Skin is warm and dry.  Neurological:     Mental Status: Alert and oriented to person, place, and time.  Psychiatric:        Behavior: Behavior normal.        Thought Content: Thought content normal.        Judgment: Judgment normal.     Procedures:    Assessment/Plan   Jose Newell is a 65 y.o. male referred by Dr. Zack Gomez for elevated PSA. Hx of BPH w/ LUTS, ED, hematuria. MRI Prostate (4/28/25) showed 73.04 ml, a PI-RADS 4 lesion in the right posteromedial mid PZ, no gross extracapsular extension, no enlarged pelvic  lymph nodes. S/p TP prostate biopsy (6/10/25) with pathology showing prostatic adenocarcinoma, CAN#1 GG2 (Callaway score 3+4=7), 3/6 cores (50% of tissue), pattern 4 (5%), focal cribriform positive. S/p biopsy reports hematuria and hematospermia. Management options including risks, benefits and alternatives discussed at length and all questions answered. Patient prefers to proceed with active surveillance, will follow-up in 6mos with PSA.     Rx Cialis 5mg         Scribe Attestation:  By signing my name below, Delfina BUTTERFIELD Scribe   attest that this documentation has been prepared under the direction and in the presence of Jose Garcia MD.

## 2025-07-03 ENCOUNTER — APPOINTMENT (OUTPATIENT)
Dept: UROLOGY | Facility: CLINIC | Age: 65
End: 2025-07-03
Payer: COMMERCIAL

## 2025-07-03 VITALS — TEMPERATURE: 97 F | WEIGHT: 163.2 LBS | BODY MASS INDEX: 27.86 KG/M2 | HEIGHT: 64 IN

## 2025-07-03 DIAGNOSIS — C61 PROSTATE CANCER (MULTI): Primary | ICD-10-CM

## 2025-07-03 PROCEDURE — 1159F MED LIST DOCD IN RCRD: CPT | Performed by: STUDENT IN AN ORGANIZED HEALTH CARE EDUCATION/TRAINING PROGRAM

## 2025-07-03 PROCEDURE — 3008F BODY MASS INDEX DOCD: CPT | Performed by: STUDENT IN AN ORGANIZED HEALTH CARE EDUCATION/TRAINING PROGRAM

## 2025-07-03 PROCEDURE — 99214 OFFICE O/P EST MOD 30 MIN: CPT | Performed by: STUDENT IN AN ORGANIZED HEALTH CARE EDUCATION/TRAINING PROGRAM

## 2025-07-03 PROCEDURE — 1126F AMNT PAIN NOTED NONE PRSNT: CPT | Performed by: STUDENT IN AN ORGANIZED HEALTH CARE EDUCATION/TRAINING PROGRAM

## 2025-07-03 RX ORDER — TADALAFIL 5 MG/1
5 TABLET ORAL DAILY
Qty: 90 TABLET | Refills: 3 | Status: SHIPPED | OUTPATIENT
Start: 2025-07-03 | End: 2026-07-03

## 2025-07-03 RX ORDER — HYDROCORTISONE 25 MG/G
CREAM TOPICAL
COMMUNITY
Start: 2025-01-31

## 2025-07-03 ASSESSMENT — PAIN SCALES - GENERAL: PAINLEVEL_OUTOF10: 0-NO PAIN

## 2025-07-30 NOTE — PROGRESS NOTES
Subjective   Patient ID: Jose Newell is a 65 y.o. male who presents for a follow-up visit    HPI   Since early last week, the patient reports a history of generalized fatigue he does report that from the beginning of last week up until this past weekend he experienced alternating periods of chills and sweats,..  He reports experiencing intermittent episodes of nausea as well.  He reports that the episodes when they recurred occurred after oral intake and were 1 hour in duration.  He reports passage of 1 watery stool early in the week.  He also reports a 1 to 2-day history of pain at the onset of urination occurring early last week.  He reports no other associated symptoms.  Since this past weekend, he does report that his energy level has improved markedly but is not yet back to baseline.  Review of Systems    Objective   There were no vitals taken for this visit.    Physical Exam  Neck - no lymphadenopathy. Thyroid gland not enlarged, No bruits.  Lungs - clear to auscultation bilaterally.  Cardiac -  rate normal, rhythm regular, no murmurs, no JVD.   Abdomen - soft nondistended, normal active bowel sounds. Palpation revealed no masses.  No inguinal lymphadenopathy.  Extremities-no peripheral edema  No axillary lymphadenopathy noted.  Assessment/Plan   Problem List Items Addressed This Visit           ICD-10-CM    Prostate cancer (Multi) C61     Other Visit Diagnoses         Codes      Malaise and fatigue    -  Primary R53.81, R53.83             Assessment  History of alternating periods of chills and sweats-may have been secondary to viral syndrome  Intermittent episodes of nausea-may have been secondary to viral syndrome  Passage of 1 watery stool-may have been secondary to viral syndrome  2-day history of pain at the onset of urination-unsure of etiology.  Generalized fatigue-may be secondary to viral syndrome.  Plan  Patient reassured.  I told the patient to call me in 7 to 10 days with his condition or  sooner if symptoms recur or his energy level decreases.

## 2025-07-31 ENCOUNTER — APPOINTMENT (OUTPATIENT)
Dept: PRIMARY CARE | Facility: CLINIC | Age: 65
End: 2025-07-31
Payer: COMMERCIAL

## 2025-07-31 VITALS
DIASTOLIC BLOOD PRESSURE: 64 MMHG | TEMPERATURE: 97 F | HEART RATE: 70 BPM | SYSTOLIC BLOOD PRESSURE: 94 MMHG | WEIGHT: 164 LBS | BODY MASS INDEX: 28.15 KG/M2

## 2025-07-31 DIAGNOSIS — R53.81 MALAISE AND FATIGUE: Primary | ICD-10-CM

## 2025-07-31 DIAGNOSIS — C61 PROSTATE CANCER (MULTI): ICD-10-CM

## 2025-07-31 DIAGNOSIS — R53.83 MALAISE AND FATIGUE: Primary | ICD-10-CM

## 2025-07-31 PROCEDURE — 99213 OFFICE O/P EST LOW 20 MIN: CPT | Performed by: INTERNAL MEDICINE

## 2025-07-31 PROCEDURE — 1159F MED LIST DOCD IN RCRD: CPT | Performed by: INTERNAL MEDICINE

## 2025-07-31 PROCEDURE — 1160F RVW MEDS BY RX/DR IN RCRD: CPT | Performed by: INTERNAL MEDICINE

## 2025-12-18 ENCOUNTER — APPOINTMENT (OUTPATIENT)
Dept: UROLOGY | Facility: CLINIC | Age: 65
End: 2025-12-18
Payer: COMMERCIAL

## 2026-01-26 ENCOUNTER — APPOINTMENT (OUTPATIENT)
Dept: PRIMARY CARE | Facility: CLINIC | Age: 66
End: 2026-01-26
Payer: COMMERCIAL

## (undated) DEVICE — GOWN, SURGICAL, SIRUS, NON REINFORCED, LARGE

## (undated) DEVICE — BLANKET, LOWER BODY, VHA PLUS, ADULT

## (undated) DEVICE — APPLICATOR, CHLORAPREP, W/ORANGE TINT, 26ML

## (undated) DEVICE — Device

## (undated) DEVICE — SYRINGE, TOOMEY, IRRIGATION, 60ML, INDIVIDUAL WRAP, STERILE

## (undated) DEVICE — SOLUTION, IRRIGATION, X RX SODIUM CHL 0.9%, 1000ML BTL

## (undated) DEVICE — PERINEOLOGIC PRECISIONPOINT TRANSPERINEAL ACCESS, BIOPSY NEEDLE GUIDE (P1001)

## (undated) DEVICE — NEEDLE, BIOPSY, MAX CORE, 18G

## (undated) DEVICE — DRESSING, TELFA, 3X4

## (undated) DEVICE — 20GA X 15CM, CHIBA-STYLE NEEDLE

## (undated) DEVICE — GLOVE, SURGICAL, PROTEXIS PI , 7.5, PF, LF